# Patient Record
Sex: MALE | Race: ASIAN | NOT HISPANIC OR LATINO | Employment: UNEMPLOYED | URBAN - METROPOLITAN AREA
[De-identification: names, ages, dates, MRNs, and addresses within clinical notes are randomized per-mention and may not be internally consistent; named-entity substitution may affect disease eponyms.]

---

## 2022-06-04 ENCOUNTER — HOSPITAL ENCOUNTER (INPATIENT)
Facility: HOSPITAL | Age: 77
LOS: 1 days | Discharge: HOME | End: 2022-06-05
Attending: HOSPITALIST | Admitting: INTERNAL MEDICINE

## 2022-06-04 ENCOUNTER — APPOINTMENT (OUTPATIENT)
Dept: MRI IMAGING | Facility: HOSPITAL | Age: 77
End: 2022-06-04

## 2022-06-04 DIAGNOSIS — R26.2 DIFFICULTY IN WALKING: ICD-10-CM

## 2022-06-04 DIAGNOSIS — G43.B0 OPHTHALMOPLEGIC MIGRAINE, NOT INTRACTABLE: ICD-10-CM

## 2022-06-04 DIAGNOSIS — R13.10 DYSPHAGIA, UNSPECIFIED TYPE: Primary | ICD-10-CM

## 2022-06-04 PROBLEM — G47.33 OBSTRUCTIVE SLEEP APNEA: Status: ACTIVE | Noted: 2022-06-04

## 2022-06-04 PROBLEM — N13.8 BPH WITH OBSTRUCTION/LOWER URINARY TRACT SYMPTOMS: Status: ACTIVE | Noted: 2022-06-04

## 2022-06-04 PROBLEM — I10 PRIMARY HYPERTENSION: Status: ACTIVE | Noted: 2022-06-04

## 2022-06-04 PROBLEM — K58.2 IRRITABLE BOWEL SYNDROME WITH BOTH CONSTIPATION AND DIARRHEA: Status: ACTIVE | Noted: 2022-06-04

## 2022-06-04 PROBLEM — N40.1 BPH WITH OBSTRUCTION/LOWER URINARY TRACT SYMPTOMS: Status: ACTIVE | Noted: 2022-06-04

## 2022-06-04 PROBLEM — G45.9 TIA (TRANSIENT ISCHEMIC ATTACK): Status: ACTIVE | Noted: 2022-06-04

## 2022-06-04 LAB
ALBUMIN SERPL-MCNC: 4.1 G/DL (ref 3.5–5.2)
ALBUMIN/GLOB SERPL: 1.2 G/DL
ALP SERPL-CCNC: 78 U/L (ref 39–117)
ALT SERPL W P-5'-P-CCNC: 15 U/L (ref 1–41)
ANION GAP SERPL CALCULATED.3IONS-SCNC: 8.5 MMOL/L (ref 5–15)
AST SERPL-CCNC: 34 U/L (ref 1–40)
BASOPHILS # BLD AUTO: 0.03 10*3/MM3 (ref 0–0.2)
BASOPHILS NFR BLD AUTO: 0.8 % (ref 0–1.5)
BILIRUB SERPL-MCNC: 1.1 MG/DL (ref 0–1.2)
BUN SERPL-MCNC: 8 MG/DL (ref 8–23)
BUN/CREAT SERPL: 9.8 (ref 7–25)
CALCIUM SPEC-SCNC: 8.9 MG/DL (ref 8.6–10.5)
CHLORIDE SERPL-SCNC: 99 MMOL/L (ref 98–107)
CHOLEST SERPL-MCNC: 135 MG/DL (ref 0–200)
CO2 SERPL-SCNC: 27.5 MMOL/L (ref 22–29)
CREAT SERPL-MCNC: 0.82 MG/DL (ref 0.76–1.27)
DEPRECATED RDW RBC AUTO: 45.9 FL (ref 37–54)
EGFRCR SERPLBLD CKD-EPI 2021: 91.6 ML/MIN/1.73
EOSINOPHIL # BLD AUTO: 0.14 10*3/MM3 (ref 0–0.4)
EOSINOPHIL NFR BLD AUTO: 3.6 % (ref 0.3–6.2)
ERYTHROCYTE [DISTWIDTH] IN BLOOD BY AUTOMATED COUNT: 13.3 % (ref 12.3–15.4)
GLOBULIN UR ELPH-MCNC: 3.3 GM/DL
GLUCOSE BLDC GLUCOMTR-MCNC: 102 MG/DL (ref 70–99)
GLUCOSE BLDC GLUCOMTR-MCNC: 95 MG/DL (ref 70–99)
GLUCOSE SERPL-MCNC: 110 MG/DL (ref 65–99)
HBA1C MFR BLD: 5.2 % (ref 4.8–5.6)
HCT VFR BLD AUTO: 40.5 % (ref 37.5–51)
HDLC SERPL-MCNC: 38 MG/DL (ref 40–60)
HGB BLD-MCNC: 13.5 G/DL (ref 13–17.7)
IMM GRANULOCYTES # BLD AUTO: 0.02 10*3/MM3 (ref 0–0.05)
IMM GRANULOCYTES NFR BLD AUTO: 0.5 % (ref 0–0.5)
LARGE PLATELETS: NORMAL
LDLC SERPL CALC-MCNC: 72 MG/DL (ref 0–100)
LDLC/HDLC SERPL: 1.82 {RATIO}
LYMPHOCYTES # BLD AUTO: 0.9 10*3/MM3 (ref 0.7–3.1)
LYMPHOCYTES NFR BLD AUTO: 22.9 % (ref 19.6–45.3)
MCH RBC QN AUTO: 30.8 PG (ref 26.6–33)
MCHC RBC AUTO-ENTMCNC: 33.3 G/DL (ref 31.5–35.7)
MCV RBC AUTO: 92.5 FL (ref 79–97)
MONOCYTES # BLD AUTO: 0.47 10*3/MM3 (ref 0.1–0.9)
MONOCYTES NFR BLD AUTO: 12 % (ref 5–12)
NEUTROPHILS NFR BLD AUTO: 2.37 10*3/MM3 (ref 1.7–7)
NEUTROPHILS NFR BLD AUTO: 60.2 % (ref 42.7–76)
NRBC BLD AUTO-RTO: 0 /100 WBC (ref 0–0.2)
PLATELET # BLD AUTO: 60 10*3/MM3 (ref 140–450)
PMV BLD AUTO: 10.9 FL (ref 6–12)
POTASSIUM SERPL-SCNC: 4 MMOL/L (ref 3.5–5.2)
PROT SERPL-MCNC: 7.4 G/DL (ref 6–8.5)
RBC # BLD AUTO: 4.38 10*6/MM3 (ref 4.14–5.8)
RBC MORPH BLD: NORMAL
SMALL PLATELETS BLD QL SMEAR: NORMAL
SODIUM SERPL-SCNC: 135 MMOL/L (ref 136–145)
TRIGL SERPL-MCNC: 140 MG/DL (ref 0–150)
VLDLC SERPL-MCNC: 25 MG/DL (ref 5–40)
WBC MORPH BLD: NORMAL
WBC NRBC COR # BLD: 3.93 10*3/MM3 (ref 3.4–10.8)

## 2022-06-04 PROCEDURE — 83036 HEMOGLOBIN GLYCOSYLATED A1C: CPT | Performed by: HOSPITALIST

## 2022-06-04 PROCEDURE — 70551 MRI BRAIN STEM W/O DYE: CPT

## 2022-06-04 PROCEDURE — 85025 COMPLETE CBC W/AUTO DIFF WBC: CPT | Performed by: INTERNAL MEDICINE

## 2022-06-04 PROCEDURE — 94660 CPAP INITIATION&MGMT: CPT

## 2022-06-04 PROCEDURE — 92610 EVALUATE SWALLOWING FUNCTION: CPT

## 2022-06-04 PROCEDURE — 85007 BL SMEAR W/DIFF WBC COUNT: CPT | Performed by: INTERNAL MEDICINE

## 2022-06-04 PROCEDURE — 99223 1ST HOSP IP/OBS HIGH 75: CPT | Performed by: HOSPITALIST

## 2022-06-04 PROCEDURE — 97161 PT EVAL LOW COMPLEX 20 MIN: CPT

## 2022-06-04 PROCEDURE — 80061 LIPID PANEL: CPT | Performed by: HOSPITALIST

## 2022-06-04 PROCEDURE — 94799 UNLISTED PULMONARY SVC/PX: CPT

## 2022-06-04 PROCEDURE — 80053 COMPREHEN METABOLIC PANEL: CPT | Performed by: INTERNAL MEDICINE

## 2022-06-04 PROCEDURE — 82962 GLUCOSE BLOOD TEST: CPT

## 2022-06-04 RX ORDER — FLUTICASONE PROPIONATE 50 MCG
2 SPRAY, SUSPENSION (ML) NASAL DAILY
COMMUNITY

## 2022-06-04 RX ORDER — TAMSULOSIN HYDROCHLORIDE 0.4 MG/1
0.4 CAPSULE ORAL DAILY
Status: DISCONTINUED | OUTPATIENT
Start: 2022-06-04 | End: 2022-06-05 | Stop reason: HOSPADM

## 2022-06-04 RX ORDER — LOSARTAN POTASSIUM 25 MG/1
100 TABLET ORAL DAILY
Status: DISCONTINUED | OUTPATIENT
Start: 2022-06-04 | End: 2022-06-05 | Stop reason: HOSPADM

## 2022-06-04 RX ORDER — CITALOPRAM 40 MG/1
40 TABLET ORAL DAILY
COMMUNITY

## 2022-06-04 RX ORDER — CITALOPRAM 20 MG/1
40 TABLET ORAL DAILY
Status: DISCONTINUED | OUTPATIENT
Start: 2022-06-04 | End: 2022-06-05 | Stop reason: HOSPADM

## 2022-06-04 RX ORDER — ATENOLOL 25 MG/1
25 TABLET ORAL 2 TIMES DAILY
COMMUNITY

## 2022-06-04 RX ORDER — TAMSULOSIN HYDROCHLORIDE 0.4 MG/1
1 CAPSULE ORAL DAILY
COMMUNITY

## 2022-06-04 RX ORDER — ASPIRIN 300 MG/1
300 SUPPOSITORY RECTAL DAILY
Status: DISCONTINUED | OUTPATIENT
Start: 2022-06-04 | End: 2022-06-05 | Stop reason: HOSPADM

## 2022-06-04 RX ORDER — MONTELUKAST SODIUM 10 MG/1
10 TABLET ORAL DAILY
Status: DISCONTINUED | OUTPATIENT
Start: 2022-06-04 | End: 2022-06-05 | Stop reason: HOSPADM

## 2022-06-04 RX ORDER — SODIUM CHLORIDE 0.9 % (FLUSH) 0.9 %
10 SYRINGE (ML) INJECTION EVERY 12 HOURS SCHEDULED
Status: DISCONTINUED | OUTPATIENT
Start: 2022-06-04 | End: 2022-06-05 | Stop reason: HOSPADM

## 2022-06-04 RX ORDER — PANTOPRAZOLE SODIUM 40 MG/1
40 TABLET, DELAYED RELEASE ORAL DAILY
COMMUNITY

## 2022-06-04 RX ORDER — LOSARTAN POTASSIUM 100 MG/1
100 TABLET ORAL DAILY
COMMUNITY

## 2022-06-04 RX ORDER — LABETALOL HYDROCHLORIDE 5 MG/ML
10 INJECTION, SOLUTION INTRAVENOUS
Status: DISCONTINUED | OUTPATIENT
Start: 2022-06-04 | End: 2022-06-05 | Stop reason: HOSPADM

## 2022-06-04 RX ORDER — FLUTICASONE PROPIONATE 50 MCG
2 SPRAY, SUSPENSION (ML) NASAL DAILY
Status: DISCONTINUED | OUTPATIENT
Start: 2022-06-04 | End: 2022-06-05 | Stop reason: HOSPADM

## 2022-06-04 RX ORDER — BUTALBITAL, ACETAMINOPHEN AND CAFFEINE 300; 40; 50 MG/1; MG/1; MG/1
1 CAPSULE ORAL ONCE
Status: COMPLETED | OUTPATIENT
Start: 2022-06-04 | End: 2022-06-04

## 2022-06-04 RX ORDER — ATORVASTATIN CALCIUM 40 MG/1
80 TABLET, FILM COATED ORAL NIGHTLY
Status: DISCONTINUED | OUTPATIENT
Start: 2022-06-04 | End: 2022-06-05 | Stop reason: HOSPADM

## 2022-06-04 RX ORDER — PANTOPRAZOLE SODIUM 40 MG/1
40 TABLET, DELAYED RELEASE ORAL
Status: DISCONTINUED | OUTPATIENT
Start: 2022-06-04 | End: 2022-06-05 | Stop reason: HOSPADM

## 2022-06-04 RX ORDER — MONTELUKAST SODIUM 10 MG/1
10 TABLET ORAL DAILY
COMMUNITY

## 2022-06-04 RX ORDER — BUTALBITAL, ACETAMINOPHEN AND CAFFEINE 300; 40; 50 MG/1; MG/1; MG/1
1 CAPSULE ORAL EVERY 4 HOURS PRN
Status: DISCONTINUED | OUTPATIENT
Start: 2022-06-04 | End: 2022-06-05 | Stop reason: HOSPADM

## 2022-06-04 RX ORDER — ASPIRIN 81 MG/1
81 TABLET, CHEWABLE ORAL DAILY
Status: DISCONTINUED | OUTPATIENT
Start: 2022-06-04 | End: 2022-06-05 | Stop reason: HOSPADM

## 2022-06-04 RX ORDER — SODIUM CHLORIDE 0.9 % (FLUSH) 0.9 %
10 SYRINGE (ML) INJECTION AS NEEDED
Status: DISCONTINUED | OUTPATIENT
Start: 2022-06-04 | End: 2022-06-05 | Stop reason: HOSPADM

## 2022-06-04 RX ADMIN — LOSARTAN POTASSIUM 100 MG: 25 TABLET, FILM COATED ORAL at 11:33

## 2022-06-04 RX ADMIN — TAMSULOSIN HYDROCHLORIDE 0.4 MG: 0.4 CAPSULE ORAL at 08:36

## 2022-06-04 RX ADMIN — FLUTICASONE PROPIONATE 2 SPRAY: 50 SPRAY, METERED NASAL at 08:36

## 2022-06-04 RX ADMIN — Medication 10 ML: at 08:36

## 2022-06-04 RX ADMIN — MONTELUKAST 10 MG: 10 TABLET, FILM COATED ORAL at 08:35

## 2022-06-04 RX ADMIN — CARBIDOPA AND LEVODOPA 1 TABLET: 25; 100 TABLET ORAL at 08:35

## 2022-06-04 RX ADMIN — Medication 10 ML: at 21:01

## 2022-06-04 RX ADMIN — CITALOPRAM HYDROBROMIDE 40 MG: 20 TABLET ORAL at 08:35

## 2022-06-04 RX ADMIN — ASPIRIN 81 MG CHEWABLE TABLET 81 MG: 81 TABLET CHEWABLE at 08:35

## 2022-06-04 RX ADMIN — BUTALBITAL, ACETAMINOPHEN AND CAFFEINE 1 CAPSULE: 300; 40; 50 CAPSULE ORAL at 11:33

## 2022-06-04 RX ADMIN — PANTOPRAZOLE SODIUM 40 MG: 40 TABLET, DELAYED RELEASE ORAL at 06:17

## 2022-06-04 RX ADMIN — ATORVASTATIN CALCIUM 80 MG: 40 TABLET, FILM COATED ORAL at 21:01

## 2022-06-04 RX ADMIN — CARBIDOPA AND LEVODOPA 2 TABLET: 25; 100 TABLET ORAL at 17:11

## 2022-06-04 NOTE — THERAPY EVALUATION
Acute Care - Physical Therapy Initial Evaluation   Brad     Patient Name: López Lee  : 1945  MRN: [id number]  Today's Date: 2022   Onset of Illness/Injury or Date of Surgery: 22  Visit Dx:     ICD-10-CM ICD-9-CM   1. Dysphagia, unspecified type  R13.10 787.20   2. Difficulty in walking  R26.2 719.7     Patient Active Problem List   Diagnosis   • TIA (transient ischemic attack)   • BPH with obstruction/lower urinary tract symptoms   • Primary hypertension   • Irritable bowel syndrome with both constipation and diarrhea   • Obstructive sleep apnea     Past Medical History:   Diagnosis Date   • Enlarged prostate    • Hypertension    • Irritable bowel syndrome (IBS)    • Sleep apnea      Past Surgical History:   Procedure Laterality Date   • EYE SURGERY     • TONSILLECTOMY       PT Assessment (last 12 hours)     PT Evaluation and Treatment     Row Name 22 1230          Physical Therapy Time and Intention    Subjective Information no complaints  -DW     Document Type evaluation  -DW     Mode of Treatment individual therapy  -DW     Patient Effort good  -DW     Row Name 22 1230          General Information    Patient Profile Reviewed yes  -DW     Onset of Illness/Injury or Date of Surgery 22  -DW     Referring Physician Mirza Mcmanus  -DW     Patient Observations alert  -DW     Prior Level of Function independent:  -DW     Equipment Currently Used at Home none  -DW     Benefits Reviewed patient:  -DW     Row Name 22 1230          Previous Level of Function/Home Environm    BADLs, Premorbid Functional Level independent  -DW     IADLs, Premorbid Functional Level independent  -DW     Transfers, Premorbid Functional Level independent  -DW     Community Ambulation, Premorbid Functional Level independent  -DW     Row Name 22 1230          Living Environment    Current Living Arrangements home  -DW     People in Home spouse  -DW     Row Name 22 1230           Pain    Pretreatment Pain Rating 0/10 - no pain  -     Row Name 06/04/22 1230          Cognition    Affect/Mental Status (Cognition) WNL  -     Orientation Status (Cognition) oriented x 3  -     Row Name 06/04/22 1230          Range of Motion (ROM)    Range of Motion ROM is WNL  -     Row Name 06/04/22 1230          Strength (Manual Muscle Testing)    Strength (Manual Muscle Testing) strength is WNL  -     Row Name 06/04/22 1230          Bed Mobility    Bed Mobility bed mobility (all) activities  -     All Activities, [place] (Bed Mobility) independent  -     Row Name 06/04/22 1230          Transfers    Transfers bed-chair transfer  -DW     Bed-Chair [place] (Transfers) independent  -     Row Name 06/04/22 1230          Gait/Stairs (Locomotion)    Gait/Stairs Locomotion gait/ambulation independence;distance ambulated  -     [place] Level (Gait) independent  -     Distance in Feet (Gait) 300  -     Row Name 06/04/22 1230          Balance    Balance Assessment standing dynamic balance  -     Dynamic Standing Balance independent  -     Row Name 06/04/22 1230          Plan of Care Review    Plan of Care Reviewed With patient  -     Progress improving  -     Outcome Evaluation DC PT due to pt being at prior level of function.  No skilled PT intervention is needed at this time. q  -     Row Name 06/04/22 1230          Therapy Assessment/Plan (PT)    PT Diagnosis (PT) Difficulty in walking  -     Therapy Frequency (PT) evaluation only  -     Row Name 06/04/22 1230          PT Evaluation Complexity    History, PT Evaluation Complexity no personal factors and/or comorbidities  -     Examination of Body Systems (PT Eval Complexity) 1-2 elements  -     Clinical Presentation (PT Evaluation Complexity) stable  -     Clinical Decision Making (PT Evaluation Complexity) low complexity  -     Overall Complexity (PT Evaluation Complexity) low complexity  -     Row Name  06/04/22 1230          Therapy Plan Review/Discharge Plan (PT)    Therapy Plan Review (PT) evaluation/treatment results reviewed  -           User Key  (r) = Recorded By, (t) = Taken By, (c) = Cosigned By    Initials Name Provider Type    Tony Joseph, RIKY Physical Therapist                Physical Therapy Education                 Title: PT OT SLP Therapies (Done)     Topic: Physical Therapy (Done)     Point: Mobility training (Done)     Learning Progress Summary           Patient Acceptance, E,TB, VU by LORI at 6/4/2022 1234                   Point: Home exercise program (Done)     Learning Progress Summary           Patient Acceptance, E,TB, VU by LORI at 6/4/2022 1234                   Point: Body mechanics (Done)     Learning Progress Summary           Patient Acceptance, E,TB, VU by LORI at 6/4/2022 1234                   Point: Precautions (Done)     Learning Progress Summary           Patient Acceptance, E,TB, VU by  at 6/4/2022 1234                               User Key     Initials Effective Dates Name Provider Type Discipline     04/25/21 -  Tony Figueroa, PT Physical Therapist PT              PT Recommendation and Plan  Anticipated Discharge Disposition (PT): home  Therapy Frequency (PT): evaluation only  Plan of Care Reviewed With: patient  Progress: improving  Outcome Evaluation: DC PT due to pt being at prior level of function.  No skilled PT intervention is needed at this time. q   Outcome Measures     Row Name 06/04/22 1234             How much help from another person do you currently need...    Turning from your back to your side while in flat bed without using bedrails? 4  -DW      Moving from lying on back to sitting on the side of a flat bed without bedrails? 4  -DW      Moving to and from a bed to a chair (including a wheelchair)? 4  -DW      Standing up from a chair using your arms (e.g., wheelchair, bedside chair)? 4  -DW      Climbing 3-5 steps with a railing? 4  -DW      To walk in  hospital room? 4  -DW      AM-PAC 6 Clicks Score (PT) 24  -DW              Functional Assessment    Outcome Measure Options AM-PAC 6 Clicks Basic Mobility (PT)  -DW            User Key  (r) = Recorded By, (t) = Taken By, (c) = Cosigned By    Initials Name Provider Type    Tony Joseph PT Physical Therapist                 Time Calculation:    PT Charges     Row Name 06/04/22 1235             Time Calculation    PT Received On 06/04/22  -DW              Untimed Charges    PT Eval/Re-eval Minutes 15  -DW              Total Minutes    Untimed Charges Total Minutes 15  -DW       Total Minutes 15  -DW            User Key  (r) = Recorded By, (t) = Taken By, (c) = Cosigned By    Initials Name Provider Type    Tony Joseph PT Physical Therapist              Therapy Charges for Today     Code Description Service Date Service Provider Modifiers Qty    29412616115 HC PT EVAL LOW COMPLEXITY 2 6/4/2022 Tony Figueroa, PT GP 1          PT G-Codes  Outcome Measure Options: AM-PAC 6 Clicks Basic Mobility (PT)  AM-PAC 6 Clicks Score (PT): 24    Tony Figueroa PT  6/4/2022

## 2022-06-04 NOTE — CONSULTS
[facility]   Consult Note      Patient Name: López Lee  : 1945  MRN: [id number]  Primary Care Physician:  Provider, No Known  Date of admission: 2022    Subjective   Subjective     This 76 years old gentleman was seen upon request of Dr. Mcmanus for evaluation.    Chief Complaint:   Stroke    HPI:  The wife is at the bedside.    He dated the onset of his illness sometime on Mayra 3, 2022 when he started having visual difficulties.  He claims that his vision was dark on the outside and blurry in the center.  This was accompanied by nondescript generalized though headache with a grade of 2/10.  Shortly afterwards, he started having difficulty in finding words to say.  There is no associate difficulty in hearing as well as in swallowing.  No difficulty in breathing.  No chest pains or abdominal pain.  There is no numbness anywhere.  The whole spell lasted for 20 minutes.  Afterwards, he was all right.  He went to the [facility] and was subsequently transferred to the [facility] and was subsequently admitted.      Review of Systems  Is no difficulty in hearing as well as in swallowing.  No difficulty breathing.  There is no chest pains or abdominal pains.  He was not incontinent of his urine.      Past Medical History:   Diagnosis Date   • Enlarged prostate    • Hypertension    • Irritable bowel syndrome (IBS)    • Sleep apnea        Past Surgical History:   Procedure Laterality Date   • EYE SURGERY     • TONSILLECTOMY         Family History: family history is not on file. Otherwise pertinent FHx was reviewed and not pertinent to current issue.    Social History:  reports that he has never smoked. He does not have any smokeless tobacco history on file.    Psychosocial History: Not known at this time    Home Medications:  Choline Bitartrate, carbidopa-levodopa, citalopram, fluticasone, losartan, montelukast, pantoprazole, and tamsulosin      Allergies:  No Known  Allergies    Vitals:   Temp:  [98.1 °F (36.7 °C)-98.2 °F (36.8 °C)] 98.2 °F (36.8 °C)  Heart Rate:  [55-62] 55  Resp:  [16] 16  BP: (146-184)/(71-93) 158/75  Physical Exam   He was awake and alert was standing from distress.  Was fairly developed and fairly nourished.    His heart was regular heart rate was 56/min.  There were no murmurs.  The lungs were clear.    The carotid pulses were 1+ and equal.  There were no bruits on either side.    Neurological Examination:  's responses were coherent and relevant.  He was aware of what was going on around him.  He had an insight to his condition.  He was able to understand and follow verbal commands.    I did not look into the fundus on either side because of the COVID-19 pandemic social distancing.    The pupils were 3 to 4 mm. They were round and equal reactive to light directly and consensually.  There was no extraocular muscle weakness.    No facial asymmetry.  No facial weakness.  Was able to hear normal conversational speech.    The strength of the sternomastoid and trapezius muscles was normal and symmetrical.  The uvula and the tongue were in the midline.    The strength in both upper and lower extremities was normal and equal.    Felt pinprick equal in both sides of the forehead, face, lower jaw, both upper and lower extremities, and both sides of his trunk.    The deep tendon reflexes were absent on both sides.    Did finger-to-nose test fairly well equal on both sides.      Result Review    Result Review:  I have personally reviewed the results from the time of this admission to 6/4/2022 12:16 EDT and agree with these findings:  []  Laboratory  []  Microbiology  [x]  Radiology  []  EKG/Telemetry   []  Cardiology/Vascular   []  Pathology  []  Old records  []  Other:  Most notable findings include:   Reviewed the computer images of the CAT scan of his brain was done on Mayra 3, 2022.  The study showed no acute changes.  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  There is moderate subcortical and periventricular white matter changes consistent with old microvascular ischemia.    I reviewed the computer images of the CT angiogram of the neck and cerebral vessels done on Mayra 3, 2022.  Study were unremarkable.  There were no hemodynamically significant stenosis or narrowing of the carotid and vertebral arterial system in the neck and their branches in the brain.      Impression:    Basilar Artery Migraine  Hypertension  Bradycardia, etiology undetermined        Plan:   We will see what the MRI of the brain show.  He was advised to refrain from being exposed to triggers of migraine.              Please note that portions of this note were completed with a voice recognition program. Some letter(s), word(s), phrase(s), and or sentence(s) may be inadvertently omitted, transcribed, or added that may change the concept or idea that is being portrayed by the author of this note or report.  This note or report have been reviewed and edited by the author.  However, the errors may recur and new errors may occur in the process when this note or report is being electronically saved.          Electronically signed by Roddy Lea Jr., MD, 06/04/22, 12:16 PM EDT.

## 2022-06-04 NOTE — PLAN OF CARE
Goal Outcome Evaluation: NIH: 0; no deficits noted. MRI completed around lunchtime- no acute infarcts seen. Platelets and HDL low, but no other significant labs. VSS. RWRN

## 2022-06-04 NOTE — THERAPY EVALUATION
Acute Care - Speech Language Pathology   Swallow Initial Evaluation HENRI Salinas     Patient Name: López Lee  : 1945  MRN: [id number]  Today's Date: 2022               Admit Date: 2022    Visit Dx:     ICD-10-CM ICD-9-CM   1. Dysphagia, unspecified type  R13.10 787.20     Patient Active Problem List   Diagnosis   • TIA (transient ischemic attack)   • BPH with obstruction/lower urinary tract symptoms   • Primary hypertension   • Irritable bowel syndrome with both constipation and diarrhea   • Obstructive sleep apnea     Past Medical History:   Diagnosis Date   • Enlarged prostate    • Hypertension    • Irritable bowel syndrome (IBS)    • Sleep apnea      Past Surgical History:   Procedure Laterality Date   • EYE SURGERY     • TONSILLECTOMY             Inpatient Speech Pathology Dysphagia Evaluation        PAIN SCALE: None indicated.    PRECAUTIONS/CONTRAINDICATIONS: Standard    SUSPECTED ABUSE/NEGLECT/EXPLOITATION: None indicated.    SOCIAL/PSYCHOLOGICAL NEEDS/BARRIERS: None indicated.    PAST SOCIAL HISTORY: 76-year-old male lives at home    PRIOR FUNCTION: Independent    PATIENT GOALS/EXPECTATIONS: Return home    HISTORY: 76-year-old male with the above diagnosis referred for speech therapy evaluation due to possible stroke.  No previous speech therapy is reported.  Patient states initially feeling difficulty finding words but that this has resolved.    CURRENT DIET LEVEL: Regular    OBJECTIVE:    TEST ADMINISTERED: Clinical dysphagia evaluation    COGNITION/SAFETY AWARENESS: Patient followed directions and answered questions without difficulty    BEHAVIORAL OBSERVATIONS: Alert and cooperative    ORAL MOTOR EXAM: Grossly within functional limits    VOICE QUALITY: Adequate    REFLEX EXAM: Deferred    POSTURE: Sitting at side of bed    FEEDING/SWALLOWING FUNCTION: Assessed with  thin liquids, puréed solids, crunchy solid.    CLINICAL OBSERVATIONS:  Thin liquid by cup and by straw appeared timely  with vocal quality remaining clear to cervical auscultation.  Purée solid with swallow completed with laryngeal elevation noted to palpation.  Crunchy solid with adequate chewing followed by swallow completed clearing the oral cavity.    DYSPHAGIA CRITERIA: Swallow appears functional for nutritional needs.  No overt clinical signs or symptoms of aspiration were noted at the bedside.    FUNCTIONAL ASSESSMENT INSTRUMENT: Patient currently scored a level 7 of 7 on Functional Communication Measures for swallowing indicating a 0 % limitation in function.    ASSESSMENT/ PLAN OF CARE:  No direct speech therapy is recommended at this time. Recommend rereferral should patient demonstrate change in status.    RECOMMENDATIONS:   1.   DIET: Diet of regular solids and thin liquids alternating small bites and small sips while sitting fully upright.        Pt/responsible party agrees with plan of care and has been informed of all alternatives, risks and benefits.                            Anticipated Discharge Disposition (SLP): home (06/04/22 0953)                                                      EDUCATION  The patient has been educated in the following areas:   Dysphagia (Swallowing Impairment) Modified Diet Instruction.              Time Calculation:    Time Calculation- SLP     Row Name 06/04/22 0953             Time Calculation- SLP    SLP Start Time 0815  -TB      SLP Stop Time 0915  -TB      SLP Time Calculation (min) 60 min  -TB      SLP Received On 06/04/22  -TB              Untimed Charges    SLP Eval/Re-eval  ST Eval Oral Pharyng Swallow - 77967  -TB      [id number] Eval Oral Pharyng Swallow Minutes 60  -TB              Total Minutes    Untimed Charges Total Minutes 60  -TB       Total Minutes 60  -TB            User Key  (r) = Recorded By, (t) = Taken By, (c) = Cosigned By    Initials Name Provider Type    TB Radha Fung SLP Speech and Language Pathologist                Therapy Charges for Today     Code  Description Service Date Service Provider Modifiers Qty    [id number]  ST EVAL ORAL PHARYNG SWALLOW 4 6/4/2022 Radha Fung, SLP GN 1               ANGIE Mascorro  6/4/2022

## 2022-06-04 NOTE — H&P
[facility] HISTORY AND PHYSICAL  Date: 2022   Patient Name: López Lee  : 1945  MRN: [id number]  Primary Care Physician:  Supriya, No Known  Date of admission: 2022    Subjective   Subjective     Chief Complaint: Double vision and difficulty speaking x1 day    HPI:    López Lee is a 76 y.o. male with medical history significant for BPH, primary hypertension, obstructive sleep apnea, IBS; presents with double vision and difficulty speaking x1 day.  Patient reports that he works as a  at a hospital.  Patient states that he was working at his computer when he started to experience tunnel vision.  Patient reports that he felt as though the lettering on the keyboard were coming out at him and things look as though they were double vision.  Patient states he also had difficulty being able to get out his words.  Patient states that he went and got his blood pressure checked and found that his systolic blood pressure was in the 200s.  At that time, patient wanted to get checked out in order to make sure nothing was going on.  Patient went to the ER for further work-up.  Patient states he has experienced these similar symptoms in the past, but has never lasted this long.  Patient states that the episode lasted for approximately 20 minutes.  Patient notes that during the episode, he did experience headache and states he was given morphine in the ER which helped to bring down his pain to 2 out of 10.  Patient states that he was sent to our facility for a complete work-up for possible stroke.  Patient had CT of the head along with CTA of the neck which showed no abnormalities.  Patient states he is currently back to his baseline.  No fever, no chills, no change in activity, no change in appetite, no URI or UTI type symptoms, no shortness of breath, no cough, no wheezing, no nausea, no vomiting, no dizziness.  Patient states that he has IBS and typically goes from constipation  to diarrhea intermittently.  Patient reports he had a work-up for cluster headaches approximately 30 years ago.  Patient states that was the last time he had an MRI of the brain, which was normal.      Personal History     Past Medical History:  Past Medical History:   Diagnosis Date   • Enlarged prostate    • Hypertension    • Irritable bowel syndrome (IBS)    • Sleep apnea         Past Surgical History:  Past Surgical History:   Procedure Laterality Date   • EYE SURGERY     • TONSILLECTOMY     Occasional alcohol use    Family History:   Intrauterine pregnancy    Social History:   Social History     Socioeconomic History   • Marital status: Single   Tobacco Use   • Smoking status: Never Smoker        Home Medications:  Prior to Admission medications    Medication Sig Start Date End Date Taking? Authorizing Provider   carbidopa-levodopa (SINEMET)  MG per tablet Take 1 tablet by mouth Every Morning.   Yes Cristian Epps MD   carbidopa-levodopa (SINEMET)  MG per tablet Take 2 tablets by mouth Every Evening.   Yes Cristian Epps MD   Choline Bitartrate (CHOLINE PO) Take  by mouth.   Yes Cristian Epps MD   citalopram (CeleXA) 40 MG tablet Take 40 mg by mouth Daily.   Yes Cristian Epps MD   fluticasone (FLONASE) 50 MCG/ACT nasal spray 2 sprays into the nostril(s) as directed by provider Daily.   Yes Cristian Epps MD   losartan (COZAAR) 100 MG tablet Take 100 mg by mouth Daily.   Yes Cristian Epps MD   montelukast (SINGULAIR) 10 MG tablet Take 10 mg by mouth Daily.   Yes Cristian Epps MD   pantoprazole (PROTONIX) 40 MG EC tablet Take 40 mg by mouth Daily.   Yes Cristian Epps MD   tamsulosin (FLOMAX) 0.4 MG capsule 24 hr capsule Take 1 capsule by mouth Daily.    Cristian Epps MD        Allergies:  No Known Allergies    Review of Systems  Review of Systems   Constitutional: Negative for activity change, appetite change, chills, fatigue and  fever.   HENT: Negative for congestion, ear pain, postnasal drip, rhinorrhea, sinus pressure, sinus pain, sneezing and sore throat.    Eyes: Negative for itching.   Respiratory: Negative for cough, shortness of breath and wheezing.    Cardiovascular: Positive for chest pain. Negative for leg swelling.   Gastrointestinal: Positive for constipation and diarrhea. Negative for abdominal pain, nausea and vomiting.   Endocrine: Negative for polydipsia and polyphagia.   Genitourinary: Negative for decreased urine volume, difficulty urinating, dysuria, flank pain, frequency, hematuria and urgency.   Musculoskeletal: Negative for arthralgias, back pain, gait problem, myalgias and neck pain.   Skin: Negative for color change.   Neurological: Positive for speech difficulty and headaches. Negative for dizziness, syncope, facial asymmetry, weakness and numbness.   Psychiatric/Behavioral: Negative for agitation and confusion.           Objective   Objective     Vitals:   Temp:  [98.1 °F (36.7 °C)] 98.1 °F (36.7 °C)  Heart Rate:  [60] 60  Resp:  [16] 16  BP: (162)/(85) 162/85    Physical Exam   Physical Exam  Constitutional:       General: He is not in acute distress.     Appearance: Normal appearance. He is not ill-appearing.   HENT:      Head: Normocephalic and atraumatic.      Right Ear: External ear normal.      Left Ear: External ear normal.      Nose: Nose normal.      Mouth/Throat:      Mouth: Mucous membranes are moist.   Eyes:      Extraocular Movements: Extraocular movements intact.      Pupils: Pupils are equal, round, and reactive to light.   Cardiovascular:      Rate and Rhythm: Normal rate and regular rhythm.      Pulses: Normal pulses.      Heart sounds: Normal heart sounds. No murmur heard.    No friction rub. No gallop.   Pulmonary:      Effort: Pulmonary effort is normal. No respiratory distress.      Breath sounds: Normal breath sounds. No wheezing, rhonchi or rales.   Abdominal:      General: Bowel sounds are  normal. There is no distension.      Tenderness: There is no abdominal tenderness.   Musculoskeletal:         General: No swelling. Normal range of motion.      Cervical back: Normal range of motion and neck supple.   Skin:     General: Skin is warm.   Neurological:      General: No focal deficit present.      Mental Status: He is alert and oriented to person, place, and time.      Cranial Nerves: No cranial nerve deficit.      Sensory: No sensory deficit.      Motor: No weakness.      Coordination: Coordination normal.          Result Review    Result Review:  Labs from [facility]  PT 15.5, INR 1.3, PTT 32.7, glucose 89, BUN 12, creatinine 0.75, sodium 135, potassium 4.2, chloride 100, bicarb 28, calcium 8.7, total protein 7.8, albumin 4, total bilirubin 0.9, alkaline phosphatase 92, AST 41, ALT 34, anion gap 6.8, cholesterol 144, triglycerides 112, HDL 40, LDL 85, troponin 17.9, WBC 4.6, RBC 4.56, hemoglobin 14.4, hematocrit 42.5, MCV 93.2 MCH 31.6, RDW 13.5, platelets 77, neutrophils 55.2%, lymphocytes 27.0%, monocytes 12.5%, eosinophils 4.0%, basophils 1.1%    COVID-19-negative    UA: Color yellow, clarity clear, specific gravity 1.02, pH 6, protein negative, ketones negative, nitrites negative, leukocyte esterase negative    Imaging:  CTA head and neck with IV contrast  Impression: No evidence of vascular injury, aneurysm, hemodynamically significant stenosis or major vessel occlusion of the intracranial arterial system.  0% stenosis of the carotid bulbs by NASCET criteria.  Patent vertebral arteries.    Chest x-ray  Impression:  No acute cardiopulmonary process.    CT head  Impression:  No acute intracranial abnormality.      Assessment & Plan   Assessment / Plan     Active Hospital Problems    Diagnosis  POA   • TIA (transient ischemic attack) [G45.9]  Yes     Priority: High   • BPH with obstruction/lower urinary tract symptoms [N40.1, N13.8]  Yes     Priority: Low   • Primary hypertension [I10]   Yes     Priority: Low   • Irritable bowel syndrome with both constipation and diarrhea [K58.2]  Yes     Priority: Low   • Obstructive sleep apnea [G47.33]  Yes     Priority: Low        Assessment/Plan:   TIA-patient presents with tunnel vision and difficulty being able to get his words out.  Patient states that episode lasted for approximately 20 minutes.  Patient noted to have systolic blood pressures in the 200s at the time.  Patient has had a CT of the head along with CTA of the neck done at outside facility.  This is normal.  Patient will complete a stroke work-up as outlined below.  Neurochecks every 4 hours  Neurology consult  Will get MRI of the brain  We will get 2D echo  PT/OT/speech consult  Primary hypertension-patient with history of hypertension.  Patient blood pressure medications will be held for now in order to allow permissible hypertension.  BPH-patient with history of BPH.  Patient will be continued on Flomax.  IBS-patient with history of IBS.  Patient states he tends to get intermittent constipation versus diarrhea.  Obstructive sleep apnea-patient reports that he sometimes has shortness of breath secondary to his underlying obstructive sleep apnea.      DVT prophylaxis:  Mechanical DVT prophylaxis orders are present.    CODE STATUS:    Level Of Support Discussed With: Patient  Code Status (Patient has no pulse and is not breathing): CPR (Attempt to Resuscitate)  Medical Interventions (Patient has pulse or is breathing): Full Support      Admission Status:  I believe this patient meets inpatient status.  Total time spent 70 minutes.    Electronically signed by Yanni Walker MD, 06/04/22, 5:56 AM EDT.

## 2022-06-04 NOTE — PLAN OF CARE
Goal Outcome Evaluation:  Plan of Care Reviewed With: patient         DYSPHAGIA CRITERIA: Swallow appears functional for nutritional needs.  No overt clinical signs or symptoms of aspiration were noted at the bedside.    FUNCTIONAL ASSESSMENT INSTRUMENT: Patient currently scored a level 7 of 7 on Functional Communication Measures for swallowing indicating a 0 % limitation in function.    ASSESSMENT/ PLAN OF CARE:  No direct speech therapy is recommended at this time. Recommend rereferral should patient demonstrate change in status.    RECOMMENDATIONS:   1.   DIET: Diet of regular solids and thin liquids alternating small bites and small sips while sitting fully upright.

## 2022-06-04 NOTE — PLAN OF CARE
Goal Outcome Evaluation:  Plan of Care Reviewed With: patient        Progress: improving  Outcome Evaluation: DC PT due to pt being at prior level of function.  No skilled PT intervention is needed at this time. q

## 2022-06-05 ENCOUNTER — READMISSION MANAGEMENT (OUTPATIENT)
Dept: CALL CENTER | Facility: HOSPITAL | Age: 77
End: 2022-06-05

## 2022-06-05 VITALS
DIASTOLIC BLOOD PRESSURE: 79 MMHG | TEMPERATURE: 97.9 F | RESPIRATION RATE: 16 BRPM | HEIGHT: 65 IN | BODY MASS INDEX: 32.91 KG/M2 | WEIGHT: 197.53 LBS | HEART RATE: 60 BPM | SYSTOLIC BLOOD PRESSURE: 159 MMHG | OXYGEN SATURATION: 97 %

## 2022-06-05 PROCEDURE — 94799 UNLISTED PULMONARY SVC/PX: CPT

## 2022-06-05 PROCEDURE — 99239 HOSP IP/OBS DSCHRG MGMT >30: CPT | Performed by: INTERNAL MEDICINE

## 2022-06-05 RX ORDER — ASPIRIN 81 MG/1
81 TABLET, CHEWABLE ORAL DAILY
Qty: 30 TABLET | Refills: 0 | Status: SHIPPED | OUTPATIENT
Start: 2022-06-06

## 2022-06-05 RX ORDER — ATORVASTATIN CALCIUM 80 MG/1
80 TABLET, FILM COATED ORAL NIGHTLY
Qty: 30 TABLET | Refills: 0 | Status: SHIPPED | OUTPATIENT
Start: 2022-06-05

## 2022-06-05 RX ORDER — BUTALBITAL, ACETAMINOPHEN AND CAFFEINE 300; 40; 50 MG/1; MG/1; MG/1
1 CAPSULE ORAL EVERY 6 HOURS PRN
Qty: 16 CAPSULE | Refills: 0 | Status: SHIPPED | OUTPATIENT
Start: 2022-06-05

## 2022-06-05 RX ADMIN — LOSARTAN POTASSIUM 100 MG: 25 TABLET, FILM COATED ORAL at 09:04

## 2022-06-05 RX ADMIN — MONTELUKAST 10 MG: 10 TABLET, FILM COATED ORAL at 09:04

## 2022-06-05 RX ADMIN — FLUTICASONE PROPIONATE 2 SPRAY: 50 SPRAY, METERED NASAL at 09:04

## 2022-06-05 RX ADMIN — Medication 10 ML: at 09:04

## 2022-06-05 RX ADMIN — PANTOPRAZOLE SODIUM 40 MG: 40 TABLET, DELAYED RELEASE ORAL at 06:25

## 2022-06-05 RX ADMIN — CITALOPRAM HYDROBROMIDE 40 MG: 20 TABLET ORAL at 09:04

## 2022-06-05 RX ADMIN — TAMSULOSIN HYDROCHLORIDE 0.4 MG: 0.4 CAPSULE ORAL at 09:04

## 2022-06-05 RX ADMIN — CARBIDOPA AND LEVODOPA 1 TABLET: 25; 100 TABLET ORAL at 06:25

## 2022-06-05 RX ADMIN — ASPIRIN 81 MG CHEWABLE TABLET 81 MG: 81 TABLET CHEWABLE at 09:04

## 2022-06-05 NOTE — DISCHARGE SUMMARY
[facility]         HOSPITALIST  DISCHARGE SUMMARY    Patient Name: López Lee  : 1945  MRN: [id number]    Date of Admission: 2022  Date of Discharge: 2022  Primary Care Physician: Provider, No Known    Consults     Date and Time Order Name Status Description    2022  7:49 AM Inpatient Neurology Consult Stroke            Active and Resolved Hospital Problems:  Active Hospital Problems    Diagnosis POA   • TIA (transient ischemic attack) [G45.9] Yes   • BPH with obstruction/lower urinary tract symptoms [N40.1, N13.8] Yes   • Primary hypertension [I10] Yes   • Irritable bowel syndrome with both constipation and diarrhea [K58.2] Yes   • Obstructive sleep apnea [G47.33] Yes      Resolved Hospital Problems   No resolved problems to display.   Questionable TIA  Basilar artery migraine  Hypertension  Asymptomatic bradycardia  BPH  IBS  Obstructive sleep apnea    Hospital Course     Hospital Course:  López Lee is a 76 y.o. male medical history significant for BPH, primary hypertension, obstructive sleep apnea, IBS; presents with double vision and difficulty speaking x1 day.  Patient states that he was working at his computer when he started to experience tunnel vision and difficulty being able to get out his words. Patient states he has experienced these similar symptoms in the past, but has never lasted this long.  Patient states that the episode lasted for approximately 20 minutes.  Patient notes that during the episode, he did experience headache and states he was given morphine in the ER which helped to bring down his pain to 2 out of 10.  Patient states that he was sent to our facility for a complete work-up for possible stroke.  Patient had CT of the head along with CTA of the neck which showed no abnormalities.    He was admitted for further care, MRI revealed no evidence of stroke.  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  All  symptoms and headache resolved prior to his admission and after Fioricet.  He was started on aspirin and statin as we cannot completely rule out TIA.  PT/OT/speech were consulted and no further therapy was recommended.  He was discharged home in stable condition on 6/5/2022.  Recommend follow-up PCP in 1 week, neurology in 1 month.    Day of Discharge     Vital Signs:  Temp:  [97.9 °F (36.6 °C)-98.8 °F (37.1 °C)] 97.9 °F (36.6 °C)  Heart Rate:  [55-62] 60  Resp:  [16-18] 18  BP: (137-177)/(63-90) 159/79  Physical Exam:   Gen: NAD, WDWN  ENT: PERRL, EOMI   CV: RRR no MRG  Pulm: CTAB, no w/r/r  GI: Abd soft, NTND, +bs  Neuro: Moving all extremities spontaneously, CN II-XII grossly intact   Psych: A&O*3, normal mood and affect  Skin: No lesions or rashes noted      Discharge Details        Discharge Medications      New Medications      Instructions Start Date   aspirin 81 MG chewable tablet   81 mg, Oral, Daily   Start Date: June 6, 2022     atorvastatin 80 MG tablet  Commonly known as: LIPITOR   80 mg, Oral, Nightly      butalbital-acetaminophen-caffeine -40 MG capsule capsule  Commonly known as: ORBIVAN   1 capsule, Oral, Every 6 Hours PRN         Continue These Medications      Instructions Start Date   atenolol 25 MG tablet  Commonly known as: TENORMIN   25 mg, Oral, 2 Times Daily      carbidopa-levodopa  MG per tablet  Commonly known as: SINEMET   1 tablet, Oral, Every Morning      carbidopa-levodopa  MG per tablet  Commonly known as: SINEMET   2 tablets, Oral, Every Evening      CHOLINE PO   3 tablets, Oral, 2 Times Daily      citalopram 40 MG tablet  Commonly known as: CeleXA   40 mg, Oral, Daily      fluticasone 50 MCG/ACT nasal spray  Commonly known as: FLONASE   2 sprays, Nasal, Daily      losartan 100 MG tablet  Commonly known as: COZAAR   100 mg, Oral, Daily      montelukast 10 MG tablet  Commonly known as: SINGULAIR   10 mg, Oral, Daily      pantoprazole 40 MG EC tablet  Commonly known  as: PROTONIX   40 mg, Oral, Daily      tamsulosin 0.4 MG capsule 24 hr capsule  Commonly known as: FLOMAX   1 capsule, Oral, Daily             No Known Allergies    Discharge Disposition:  Home or Self Care    Diet:  Hospital:  Diet Order   Procedures   • Diet Regular       Discharge Activity:   Activity Instructions     Activity as Tolerated            CODE STATUS:  Code Status and Medical Interventions:   Ordered at: 06/04/22 0556     Level Of Support Discussed With:    Patient     Code Status (Patient has no pulse and is not breathing):    CPR (Attempt to Resuscitate)     Medical Interventions (Patient has pulse or is breathing):    Full Support         No future appointments.    Additional Instructions for the Follow-ups that You Need to Schedule     Discharge Follow-up with PCP   As directed       Currently Documented PCP:    Provider, No Known    PCP Phone Number:    None     Follow Up Details: 3-5 days         Discharge Follow-up with Specified Provider: Neurology; 1 Month   As directed      To: Neurology    Follow Up: 1 Month               Pertinent  and/or Most Recent Results     PROCEDURES:   None    LAB RESULTS:      Lab 06/04/22  1206   WBC 3.93   HEMOGLOBIN 13.5   HEMATOCRIT 40.5   PLATELETS 60*   NEUTROS ABS 2.37   IMMATURE GRANS (ABS) 0.02   LYMPHS ABS 0.90   MONOS ABS 0.47   EOS ABS 0.14   MCV 92.5         Lab 06/04/22  1206   SODIUM 135*   POTASSIUM 4.0   CHLORIDE 99   CO2 27.5   ANION GAP 8.5   BUN 8   CREATININE 0.82   EGFR 91.6   GLUCOSE 110*   CALCIUM 8.9   HEMOGLOBIN A1C 5.20         Lab 06/04/22  1206   TOTAL PROTEIN 7.4   ALBUMIN 4.10   GLOBULIN 3.3   ALT (SGPT) 15   AST (SGOT) 34   BILIRUBIN 1.1   ALK PHOS 78             Lab 06/04/22  1206   CHOLESTEROL 135   LDL CHOL 72   HDL CHOL 38*   TRIGLYCERIDES 140             Brief Urine Lab Results     None        Microbiology Results (last 10 days)     ** No results found for the last 240 hours. **          MRI Brain Without Contrast    Result  Date: 6/4/2022  Impression:   1. No acute intracranial process identified. 2. Findings suggestive of moderate chronic small vessel ischemic disease.      CALVIN GOODRICH MD       Electronically Signed and Approved By: CALVIN GOODRICH MD on 6/04/2022 at 13:52                  MRI Brain Without Contrast    Result Date: 6/4/2022  Narrative: PROCEDURE: MRI BRAIN WO CONTRAST  COMPARISON: Other, CT, CT HEAD WO CONTRAST STROKE PROTOCOL, 6/03/2022, 14:55.  INDICATIONS: Headache, visual disturbance, slurred speech      TECHNIQUE: A variety of imaging planes and parameters were utilized for visualization of suspected pathology.  Images were performed without contrast.  FINDINGS:  No acute infarction, intracranial hemorrhage, or extra-axial collection is identified.  The ventricles are normal in caliber, with no evidence of mass effect or midline shift.  The basal cisterns appear patent.  Scattered foci of periventricular and subcortical white matter FLAIR hyperintensities are nonspecific, but likely the sequela of moderate chronic small vessel ischemic disease.  The midline structures appear intact.  The globes orbits appear intact.  The intracranial vascular flow voids appear patent.      Impression:   1. No acute intracranial process identified. 2. Findings suggestive of moderate chronic small vessel ischemic disease.      CALVIN GOODRICH MD       Electronically Signed and Approved By: CALVIN GOODRICH MD on 6/04/2022 at 13:52               Time spent on Discharge including face to face service:  34 minutes    Electronically signed by Mirza Hurley MD, 06/05/22, 10:42 AM EDT.

## 2022-06-05 NOTE — PLAN OF CARE
Goal Outcome Evaluation:      NIH remains zero. No changes noted throughout the night. Patient hoping to discharge home today.

## 2022-06-08 ENCOUNTER — READMISSION MANAGEMENT (OUTPATIENT)
Dept: CALL CENTER | Facility: HOSPITAL | Age: 77
End: 2022-06-08

## 2022-06-08 NOTE — OUTREACH NOTE
Stroke Week 1 Survey    Flowsheet Row Responses   [facility] patient discharged from? [facility]   Does the patient have one of the following disease processes/diagnoses(primary or secondary)? Stroke (TIA)   Week 1 attempt successful? Yes   Call start time 0914   Call end time 0918   Discharge diagnosis TIA (transient ischemic attack)   Person spoke with today (if not patient) and relationship Madai-spouse    Meds reviewed with patient/caregiver? Yes   Is the patient having any side effects they believe may be caused by any medication additions or changes? No   Does the patient have all medications ordered at discharge? Yes   Is the patient taking all medications as directed (includes completed medication regime)? No   What is preventing the patient from taking all medications as directed? Other  [one medication was ordered. Wife is in [place] so she's not sure if it's in or not. ]   Nursing Interventions Nurse provided patient education   Does the patient have a primary care provider?  Yes   Does the patient have an appointment with their PCP within 7 days of discharge? No   Comments regarding PCP PCP was updated   What is preventing the patient from scheduling follow up appointments within 7 days of discharge? Haven't had time   Nursing Interventions Advised patient to make appointment   Has the patient kept scheduled appointments due by today? N/A   Psychosocial issues? No   Does the patient require any assistance with activities of daily living such as eating, bathing, dressing, walking, etc.? No   Does the patient have any residual symptoms from stroke/TIA? No   Did the patient receive a copy of their discharge instructions? Yes   Nursing interventions Reviewed instructions with patient   What is the patient's perception of their health status since discharge? Improving   Nursing interventions Nurse provided patient education   Is the patient able to teach back FAST for Stroke? Yes   Is the patient/caregiver  able to teach back the risk factors for a stroke? High blood pressure-goal below 120/80, Smoking   Is the patient/caregiver able to teach back signs and symptoms related to disease process for when to call PCP? Yes   Is the patient/caregiver able to teach back signs and symptoms related to disease process for when to call 911? Yes   If the patient is a current smoker, are they able to teach back resources for cessation? Not a smoker   Is the patient/caregiver able to teach back the hierarchy of who to call/visit for symptoms/problems? PCP, Specialist, Home health nurse, Urgent Care, ED, 911 Yes   Week 1 call completed? Yes          TOO MCCRACKEN - Registered Nurse

## 2022-06-15 ENCOUNTER — READMISSION MANAGEMENT (OUTPATIENT)
Dept: CALL CENTER | Facility: HOSPITAL | Age: 77
End: 2022-06-15

## 2022-06-15 NOTE — OUTREACH NOTE
Stroke Week 2 Survey    Flowsheet Row Responses   [facility] discharged from? [facility]   Does the patient have one of the following disease processes/diagnoses(primary or secondary)? Stroke (TIA)   Week 2 attempt successful? No   Unsuccessful attempts Attempt 1          CONSUELO CORADO - Registered Nurse

## 2022-06-17 ENCOUNTER — READMISSION MANAGEMENT (OUTPATIENT)
Dept: CALL CENTER | Facility: HOSPITAL | Age: 77
End: 2022-06-17

## 2022-06-17 NOTE — OUTREACH NOTE
Stroke Week 2 Survey    Flowsheet Row Responses   [facility] patient discharged from? [facility]   Does the patient have one of the following disease processes/diagnoses(primary or secondary)? Stroke (TIA)   Week 2 attempt successful? Yes   Call start time 1253   Call end time 1302   Discharge diagnosis TIA (transient ischemic attack)   Person spoke with today (if not patient) and relationship patient   Meds reviewed with patient/caregiver? Yes   Does the patient have all medications ordered at discharge? Yes   Is the patient taking all medications as directed (includes completed medication regime)? Yes   Comments regarding appointments Patient reports that he has neuro follow up in July   Does the patient have a primary care provider?  Yes   Comments regarding PCP Patient reports that he followed up with PCP post discharge    Has the patient kept scheduled appointments due by today? Yes   Has home health visited the patient within 72 hours of discharge? N/A   Psychosocial issues? No   Does the patient require any assistance with activities of daily living such as eating, bathing, dressing, walking, etc.? No   Does the patient have any residual symptoms from stroke/TIA? No   Does the patient understand the diet ordered at discharge? Yes   Did the patient receive a copy of their discharge instructions? Yes   Nursing interventions Reviewed instructions with patient   What is the patient's perception of their health status since discharge? Returned to baseline/stable   Nursing interventions Nurse provided patient education   Is the patient able to teach back FAST for Stroke? Yes   Is the patient/caregiver able to teach back the risk factors for a stroke? High blood pressure-goal below 120/80   Is the patient/caregiver able to teach back signs and symptoms related to disease process for when to call PCP? Yes   Is the patient/caregiver able to teach back signs and symptoms related to disease process for when to call  911? Yes   If the patient is a current smoker, are they able to teach back resources for cessation? Not a smoker   Is the patient/caregiver able to teach back the hierarchy of who to call/visit for symptoms/problems? PCP, Specialist, Home health nurse, Urgent Care, ED, 911 Yes   Week 2 call completed? Yes   Revoked No further contact(revokes)-requires comment   Is the patient interested in additional calls from an ambulatory ?  NOTE:  applies to high risk patients requiring additional follow-up. No   Graduated/Revoked comments Patient reports doing well. Denies any new questions or concerns. Reports that he is currently in [place]. No further calls.           LILIAN YO - Registered Nurse

## 2023-05-08 ENCOUNTER — OFFICE VISIT (OUTPATIENT)
Dept: UROLOGY | Facility: CLINIC | Age: 78
End: 2023-05-08
Payer: MEDICARE

## 2023-05-08 VITALS — WEIGHT: 204 LBS | HEIGHT: 65 IN | RESPIRATION RATE: 12 BRPM | BODY MASS INDEX: 33.99 KG/M2

## 2023-05-08 DIAGNOSIS — N13.8 BPH WITH OBSTRUCTION/LOWER URINARY TRACT SYMPTOMS: Primary | ICD-10-CM

## 2023-05-08 DIAGNOSIS — N40.1 BPH WITH OBSTRUCTION/LOWER URINARY TRACT SYMPTOMS: Primary | ICD-10-CM

## 2023-05-08 PROBLEM — N40.0 BPH (BENIGN PROSTATIC HYPERPLASIA): Status: ACTIVE | Noted: 2022-06-04

## 2023-05-08 PROBLEM — G47.30 SLEEP APNEA: Status: ACTIVE | Noted: 2022-06-04

## 2023-05-08 PROBLEM — D69.3 CHRONIC ITP (IDIOPATHIC THROMBOCYTOPENIA): Status: ACTIVE | Noted: 2020-01-01

## 2023-05-08 PROBLEM — K76.0 FATTY LIVER DISEASE, NONALCOHOLIC: Status: ACTIVE | Noted: 2023-05-08

## 2023-05-08 PROBLEM — K21.9 GERD (GASTROESOPHAGEAL REFLUX DISEASE): Status: ACTIVE | Noted: 2023-05-08

## 2023-05-08 PROBLEM — K63.5 COLON POLYP: Status: ACTIVE | Noted: 2023-01-19

## 2023-05-08 LAB
BILIRUB BLD-MCNC: NEGATIVE MG/DL
CLARITY, POC: CLEAR
COLOR UR: YELLOW
EXPIRATION DATE: NORMAL
GLUCOSE UR STRIP-MCNC: NEGATIVE MG/DL
KETONES UR QL: NEGATIVE
LEUKOCYTE EST, POC: NEGATIVE
Lab: NORMAL
NITRITE UR-MCNC: NEGATIVE MG/ML
PH UR: 5.5 [PH] (ref 5–8)
PROT UR STRIP-MCNC: NEGATIVE MG/DL
RBC # UR STRIP: NEGATIVE /UL
SP GR UR: 1.02 (ref 1–1.03)
URINE VOLUME: 37
UROBILINOGEN UR QL: NORMAL

## 2023-05-08 PROCEDURE — 51798 US URINE CAPACITY MEASURE: CPT | Performed by: NURSE PRACTITIONER

## 2023-05-08 PROCEDURE — 81003 URINALYSIS AUTO W/O SCOPE: CPT | Performed by: NURSE PRACTITIONER

## 2023-05-08 PROCEDURE — 99204 OFFICE O/P NEW MOD 45 MIN: CPT | Performed by: NURSE PRACTITIONER

## 2023-05-08 PROCEDURE — 1160F RVW MEDS BY RX/DR IN RCRD: CPT | Performed by: NURSE PRACTITIONER

## 2023-05-08 PROCEDURE — 1159F MED LIST DOCD IN RCRD: CPT | Performed by: NURSE PRACTITIONER

## 2023-05-08 RX ORDER — TAMSULOSIN HYDROCHLORIDE 0.4 MG/1
2 CAPSULE ORAL DAILY
Qty: 180 CAPSULE | Refills: 4 | Status: SHIPPED | OUTPATIENT
Start: 2023-05-08

## 2023-05-08 RX ORDER — TAMSULOSIN HYDROCHLORIDE 0.4 MG/1
2 CAPSULE ORAL DAILY
Qty: 180 CAPSULE | Refills: 4 | Status: SHIPPED | OUTPATIENT
Start: 2023-05-08 | End: 2023-05-08

## 2023-05-08 RX ORDER — HYDROXYZINE PAMOATE 25 MG/1
CAPSULE ORAL
COMMUNITY
Start: 2023-04-11

## 2023-05-08 RX ORDER — ERGOCALCIFEROL 1.25 MG/1
CAPSULE ORAL
COMMUNITY
Start: 2023-05-03

## 2023-05-08 NOTE — PROGRESS NOTES
Chief Complaint: Benign Prostatic Hypertrophy (Pt here for follow up.  )    Subjective         History of Present Illness  López Lee is a 77 y.o. male presents to [facility] to be seen for f/u BPH.    The patient states that for the last several years he has had nocturia x 2-3     He states urgency with urination during the daytime.     urinary stream is weak at times.     He denies straining to void.     States hesitation with urination.     He states intermittent stream and postvoid dribble.     He has been on tamsulosin 0.4mg q day for at least 6 months but states no significant change in symptoms.    PSA on 4/26/23 was 1.2    He drinks coffee in the AM and sprite in the afternoons no significant caffeine intake.    Father had prostate cancer dx in his early 70s.         Objective     Past Medical History:   Diagnosis Date   • Enlarged prostate    • Hypertension    • Irritable bowel syndrome (IBS)    • Sleep apnea        Past Surgical History:   Procedure Laterality Date   • EYE SURGERY     • TONSILLECTOMY           Current Outpatient Medications:   •  aspirin 81 MG chewable tablet, Chew 1 tablet Daily., Disp: 30 tablet, Rfl: 0  •  atenolol (TENORMIN) 25 MG tablet, Take 1 tablet by mouth 2 (Two) Times a Day., Disp: , Rfl:   •  atorvastatin (LIPITOR) 80 MG tablet, Take 1 tablet by mouth Every Night., Disp: 30 tablet, Rfl: 0  •  carbidopa-levodopa (SINEMET)  MG per tablet, Take 2 tablets by mouth Every Evening., Disp: , Rfl:   •  Choline Bitartrate (CHOLINE PO), Take 3 tablets by mouth 2 (Two) Times a Day., Disp: , Rfl:   •  citalopram (CeleXA) 40 MG tablet, Take 1 tablet by mouth Daily., Disp: , Rfl:   •  fluticasone (FLONASE) 50 MCG/ACT nasal spray, 2 sprays into the nostril(s) as directed by provider Daily., Disp: , Rfl:   •  hydrOXYzine pamoate (VISTARIL) 25 MG capsule, , Disp: , Rfl:   •  losartan (COZAAR) 100 MG tablet, Take 1 tablet by mouth Daily., Disp: , Rfl:   •   "montelukast (SINGULAIR) 10 MG tablet, Take 1 tablet by mouth Daily., Disp: , Rfl:   •  pantoprazole (PROTONIX) 40 MG EC tablet, Take 1 tablet by mouth Daily., Disp: , Rfl:   •  tamsulosin (FLOMAX) 0.4 MG capsule 24 hr capsule, Take 2 capsules by mouth Daily., Disp: 180 capsule, Rfl: 4  •  vitamin D (ERGOCALCIFEROL) 1.25 MG (91044 UT) capsule capsule, , Disp: , Rfl:   •  butalbital-acetaminophen-caffeine (ORBIVAN) -40 MG capsule capsule, Take 1 capsule by mouth Every 6 (Six) Hours As Needed (headache)., Disp: 16 capsule, Rfl: 0    No Known Allergies     History reviewed. No pertinent family history.    Social History     Socioeconomic History   • Marital status:    Tobacco Use   • Smoking status: Never     Passive exposure: Never   • Smokeless tobacco: Never   Vaping Use   • Vaping Use: Never used   Substance and Sexual Activity   • Alcohol use: Defer   • Drug use: Defer   • Sexual activity: Defer       Vital Signs:   Resp 12   Ht 165.1 cm (65\")   Wt 92.5 kg (204 lb)   BMI 33.95 kg/m²      Physical Exam     Result Review :   The following data was reviewed by: GURMEET Heredia on 05/08/2023:  Results for orders placed or performed in visit on 05/08/23   Bladder Scan   Result Value Ref Range    Urine Volume 37    POC Urinalysis Dipstick, Automated    Specimen: Urine   Result Value Ref Range    Color Yellow Yellow, Straw, Dark Yellow, Cesia    Clarity, UA Clear Clear    Specific Gravity  1.025 1.005 - 1.030    pH, Urine 5.5 5.0 - 8.0    Leukocytes Negative Negative    Nitrite, UA Negative Negative    Protein, POC Negative Negative mg/dL    Glucose, UA Negative Negative mg/dL    Ketones, UA Negative Negative    Urobilinogen, UA Normal Normal, 0.2 E.U./dL    Bilirubin Negative Negative    Blood, UA Negative Negative    Lot Number [id number]     Expiration Date 2,024/4         Bladder Scan interpretation 05/08/2023    Estimation of residual urine via BVI 3000 Verathon Bladder Scan  MA/nurse performing: " Luis Daniel LAMAR RN   Residual Urine: 37 ml  Indication: BPH with obstruction/lower urinary tract symptoms   Position: Supine  Examination: Incremental scanning of the suprapubic area using 2.0 MHz transducer using copious amounts of acoustic gel.   Findings: An anechoic area was demonstrated which represented the bladder, with measurement of residual urine as noted. I inspected this myself. In that the residual urine was stable or insignificant, refer to plan for treatment and plan necessary at this time.         Procedures        Assessment and Plan    Diagnoses and all orders for this visit:    1. BPH with obstruction/lower urinary tract symptoms (Primary)  -     POC Urinalysis Dipstick, Automated  -     Bladder Scan  -     Discontinue: tamsulosin (FLOMAX) 0.4 MG capsule 24 hr capsule; Take 2 capsules by mouth Daily.  Dispense: 180 capsule; Refill: 4  -     tamsulosin (FLOMAX) 0.4 MG capsule 24 hr capsule; Take 2 capsules by mouth Daily.  Dispense: 180 capsule; Refill: 4      Given his symptoms I would like to have him increase his tamsulosin to 0.8mg q day.    Nocturia- Discussed with patient that nocturia is a common condition that is multifactorial in nature and can be difficult to treat, unlikely to completely irradicate, and management is dictated by patient motivation to improve and cope with symptoms.  discussed with patient, recommended behavioral modifications including fluid management, limiting fluids prior to sleep, and voiding immediately prior to sleep. Recommended that patient lay supine in the afternoon with feet above heart level to assist wtih mobilizing dependent edema which typically occurs while supine during sleep. Also, although no history of sleep apnea, could consider workup as studies have shown that with treatment of sleep apnea, nocuria can be significantly reduced. All questions addressed.         I spent 15 minutes caring for López on this date of service. This time includes time spent by me  in the following activities:reviewing tests, obtaining and/or reviewing a separately obtained history, performing a medically appropriate examination and/or evaluation , counseling and educating the patient/family/caregiver, ordering medications, tests, or procedures, and documenting information in the medical record  Follow Up   Return in about 3 months (around 8/8/2023) for f/u BPH increase tamsulosin with PVR.  Patient was given instructions and counseling regarding his condition or for health maintenance advice. Please see specific information pulled into the AVS if appropriate.         This document has been electronically signed by GURMEET Heredia  May 8, 2023 09:34 EDT

## 2023-08-09 ENCOUNTER — OFFICE VISIT (OUTPATIENT)
Dept: UROLOGY | Facility: CLINIC | Age: 78
End: 2023-08-09
Payer: MEDICARE

## 2023-08-09 VITALS — BODY MASS INDEX: 33.76 KG/M2 | HEIGHT: 65 IN | RESPIRATION RATE: 12 BRPM | WEIGHT: 202.6 LBS

## 2023-08-09 DIAGNOSIS — N13.8 BPH WITH OBSTRUCTION/LOWER URINARY TRACT SYMPTOMS: Primary | ICD-10-CM

## 2023-08-09 DIAGNOSIS — N40.1 BPH WITH OBSTRUCTION/LOWER URINARY TRACT SYMPTOMS: Primary | ICD-10-CM

## 2023-08-09 PROBLEM — D69.6 LOW PLATELET COUNT: Status: ACTIVE | Noted: 2020-06-02

## 2023-08-09 PROBLEM — F32.A DEPRESSIVE DISORDER: Status: ACTIVE | Noted: 2020-06-02

## 2023-08-09 PROBLEM — K58.9 IRRITABLE BOWEL SYNDROME: Status: ACTIVE | Noted: 2020-06-01

## 2023-08-09 PROBLEM — E55.9 VITAMIN D DEFICIENCY: Status: ACTIVE | Noted: 2020-06-02

## 2023-08-09 LAB
BILIRUB BLD-MCNC: NEGATIVE MG/DL
CLARITY, POC: CLEAR
COLOR UR: YELLOW
EXPIRATION DATE: ABNORMAL
GLUCOSE UR STRIP-MCNC: NEGATIVE MG/DL
KETONES UR QL: NEGATIVE
LEUKOCYTE EST, POC: NEGATIVE
Lab: ABNORMAL
NITRITE UR-MCNC: NEGATIVE MG/ML
PH UR: 5.5 [PH] (ref 5–8)
PROT UR STRIP-MCNC: NEGATIVE MG/DL
RBC # UR STRIP: ABNORMAL /UL
SP GR UR: 1.02 (ref 1–1.03)
URINE VOLUME: 22
UROBILINOGEN UR QL: NORMAL

## 2023-08-09 PROCEDURE — 99213 OFFICE O/P EST LOW 20 MIN: CPT | Performed by: NURSE PRACTITIONER

## 2023-08-09 PROCEDURE — 1160F RVW MEDS BY RX/DR IN RCRD: CPT | Performed by: NURSE PRACTITIONER

## 2023-08-09 PROCEDURE — 1159F MED LIST DOCD IN RCRD: CPT | Performed by: NURSE PRACTITIONER

## 2023-08-09 PROCEDURE — 81003 URINALYSIS AUTO W/O SCOPE: CPT | Performed by: NURSE PRACTITIONER

## 2023-08-09 RX ORDER — AMOXICILLIN AND CLAVULANATE POTASSIUM 875; 125 MG/1; MG/1
1 TABLET, FILM COATED ORAL
COMMUNITY
Start: 2023-07-24 | End: 2023-08-09

## 2023-08-09 NOTE — PROGRESS NOTES
Chief Complaint: Benign Prostatic Hypertrophy (Pt here for follow up.  Pt states medication is not helping.  Pt still gets up 3-4 times at night.)    Subjective         History of Present Illness  López Lee is a 77 y.o. male presents to [facility] to be seen for f/u BPH.    Last visit we increased the patient's tamsulosin to 0.8 mg daily to see if this would help to alleviate some of his nocturia as well as urgency frequency and weak urinary stream.    The patient states that the medication is not helping his symptoms at this time.    He states the urgency has somewhat improved.     Stream is still weak and intermittent.    Nocturia still x 2-3.       Previous:  The patient states that for the last several years he has had nocturia x 2-3     He states urgency with urination during the daytime.     urinary stream is weak at times.     He denies straining to void.     States hesitation with urination.     He states intermittent stream and postvoid dribble.     He has been on tamsulosin 0.4mg q day for at least 6 months but states no significant change in symptoms.    PSA on 4/26/23 was 1.2    He drinks coffee in the AM and sprite in the afternoons no significant caffeine intake.    Father had prostate cancer dx in his early 70s.         Objective     Past Medical History:   Diagnosis Date    Enlarged prostate     Hypertension     Irritable bowel syndrome (IBS)     Sleep apnea        Past Surgical History:   Procedure Laterality Date    EYE SURGERY      TONSILLECTOMY           Current Outpatient Medications:     aspirin 81 MG chewable tablet, Chew 1 tablet Daily., Disp: 30 tablet, Rfl: 0    atenolol (TENORMIN) 25 MG tablet, Take 1 tablet by mouth 2 (Two) Times a Day., Disp: , Rfl:     atorvastatin (LIPITOR) 80 MG tablet, Take 1 tablet by mouth Every Night., Disp: 30 tablet, Rfl: 0    carbidopa-levodopa (SINEMET)  MG per tablet, Take 2 tablets by mouth Every Evening., Disp: , Rfl:     " Choline Bitartrate (CHOLINE PO), Take 3 tablets by mouth 2 (Two) Times a Day., Disp: , Rfl:     citalopram (CeleXA) 40 MG tablet, Take 1 tablet by mouth Daily., Disp: , Rfl:     fluticasone (FLONASE) 50 MCG/ACT nasal spray, 2 sprays into the nostril(s) as directed by provider Daily., Disp: , Rfl:     hydrOXYzine pamoate (VISTARIL) 25 MG capsule, , Disp: , Rfl:     losartan (COZAAR) 100 MG tablet, Take 1 tablet by mouth Daily., Disp: , Rfl:     montelukast (SINGULAIR) 10 MG tablet, Take 1 tablet by mouth Daily., Disp: , Rfl:     pantoprazole (PROTONIX) 40 MG EC tablet, Take 1 tablet by mouth Daily., Disp: , Rfl:     tamsulosin (FLOMAX) 0.4 MG capsule 24 hr capsule, Take 2 capsules by mouth Daily., Disp: 180 capsule, Rfl: 4    vitamin D (ERGOCALCIFEROL) 1.25 MG (56643 UT) capsule capsule, , Disp: , Rfl:     No Known Allergies     History reviewed. No pertinent family history.    Social History     Socioeconomic History    Marital status:    Tobacco Use    Smoking status: Never     Passive exposure: Never    Smokeless tobacco: Never   Vaping Use    Vaping Use: Never used   Substance and Sexual Activity    Alcohol use: Defer    Drug use: Defer    Sexual activity: Defer       Vital Signs:   Resp 12   Ht 165.1 cm (65\")   Wt 91.9 kg (202 lb 9.6 oz)   BMI 33.71 kg/mý      Physical Exam     Result Review :   The following data was reviewed by: GURMEET Heredia on 08/09/2023:  Results for orders placed or performed in visit on 08/09/23   Bladder Scan   Result Value Ref Range    Urine Volume 22    POC Urinalysis Dipstick, Automated    Specimen: Urine   Result Value Ref Range    Color Yellow Yellow, Straw, Dark Yellow, Cesia    Clarity, UA Clear Clear    Specific Gravity  1.025 1.005 - 1.030    pH, Urine 5.5 5.0 - 8.0    Leukocytes Negative Negative    Nitrite, UA Negative Negative    Protein, POC Negative Negative mg/dL    Glucose, UA Negative Negative mg/dL    Ketones, UA Negative Negative    Urobilinogen, UA " Normal Normal, 0.2 E.U./dL    Bilirubin Negative Negative    Blood, UA Trace (A) Negative    Lot Number [id number]     Expiration Date 2,024/8         Bladder Scan interpretation 08/09/2023    Estimation of residual urine via BVI 3000 Verathon Bladder Scan  MA/nurse performing: Dania mendez ma  Residual Urine: 22 ml  Indication: BPH with obstruction/lower urinary tract symptoms   Position: Supine  Examination: Incremental scanning of the suprapubic area using 2.0 MHz transducer using copious amounts of acoustic gel.   Findings: An anechoic area was demonstrated which represented the bladder, with measurement of residual urine as noted. I inspected this myself. In that the residual urine was stable or insignificant, refer to plan for treatment and plan necessary at this time.         Procedures        Assessment and Plan    Diagnoses and all orders for this visit:    1. BPH with obstruction/lower urinary tract symptoms (Primary)  -     POC Urinalysis Dipstick, Automated  -     Bladder Scan  -     Cystometrogram; Future        Discussed options for treatment at this time.    We did discuss adding finasteride to his plan of care however given side effect profile and length of time it takes to reach efficacy the patient would not like to proceed with this intervention at this time.    Did discuss proceeding with a urodynamics test to delineate underlying etiology of his urinary symptoms and be able to effectively perform plan of care patient is agreeable and we will schedule him for this today.    I spent 10 minutes caring for López on this date of service. This time includes time spent by me in the following activities:reviewing tests, obtaining and/or reviewing a separately obtained history, performing a medically appropriate examination and/or evaluation , counseling and educating the patient/family/caregiver, ordering medications, tests, or procedures, and documenting information in the medical record  Follow Up   Return  for uds f/u 1 week after.  Patient was given instructions and counseling regarding his condition or for health maintenance advice. Please see specific information pulled into the AVS if appropriate.         This document has been electronically signed by GURMEET Heredia  August 9, 2023 09:14 EDT

## 2023-10-16 ENCOUNTER — OFFICE VISIT (OUTPATIENT)
Dept: UROLOGY | Facility: CLINIC | Age: 78
End: 2023-10-16
Payer: MEDICARE

## 2023-10-16 VITALS
RESPIRATION RATE: 12 BRPM | HEART RATE: 86 BPM | BODY MASS INDEX: 33.05 KG/M2 | WEIGHT: 198.4 LBS | DIASTOLIC BLOOD PRESSURE: 64 MMHG | SYSTOLIC BLOOD PRESSURE: 147 MMHG | HEIGHT: 65 IN

## 2023-10-16 DIAGNOSIS — N13.8 BPH WITH OBSTRUCTION/LOWER URINARY TRACT SYMPTOMS: ICD-10-CM

## 2023-10-16 DIAGNOSIS — N40.1 BPH WITH OBSTRUCTION/LOWER URINARY TRACT SYMPTOMS: ICD-10-CM

## 2023-10-16 LAB
BILIRUB BLD-MCNC: NEGATIVE MG/DL
CLARITY, POC: CLEAR
COLOR UR: NORMAL
EXPIRATION DATE: NORMAL
GLUCOSE UR STRIP-MCNC: NEGATIVE MG/DL
KETONES UR QL: NEGATIVE
LEUKOCYTE EST, POC: NEGATIVE
Lab: NORMAL
NITRITE UR-MCNC: NEGATIVE MG/ML
PH UR: 6 [PH] (ref 5–8)
PROT UR STRIP-MCNC: NEGATIVE MG/DL
RBC # UR STRIP: NEGATIVE /UL
SP GR UR: 1.02 (ref 1–1.03)
URINE VOLUME: 53
UROBILINOGEN UR QL: NORMAL

## 2023-10-16 PROCEDURE — 3077F SYST BP >= 140 MM HG: CPT | Performed by: NURSE PRACTITIONER

## 2023-10-16 PROCEDURE — 3078F DIAST BP <80 MM HG: CPT | Performed by: NURSE PRACTITIONER

## 2023-10-16 PROCEDURE — 99214 OFFICE O/P EST MOD 30 MIN: CPT | Performed by: NURSE PRACTITIONER

## 2023-10-16 PROCEDURE — 1160F RVW MEDS BY RX/DR IN RCRD: CPT | Performed by: NURSE PRACTITIONER

## 2023-10-16 PROCEDURE — 51798 US URINE CAPACITY MEASURE: CPT | Performed by: NURSE PRACTITIONER

## 2023-10-16 PROCEDURE — 81003 URINALYSIS AUTO W/O SCOPE: CPT | Performed by: NURSE PRACTITIONER

## 2023-10-16 PROCEDURE — 1159F MED LIST DOCD IN RCRD: CPT | Performed by: NURSE PRACTITIONER

## 2023-10-16 RX ORDER — FINASTERIDE 5 MG/1
5 TABLET, FILM COATED ORAL DAILY
Qty: 90 TABLET | Refills: 3 | Status: SHIPPED | OUTPATIENT
Start: 2023-10-16

## 2023-10-16 RX ORDER — DICYCLOMINE HYDROCHLORIDE 10 MG/1
10 CAPSULE ORAL
COMMUNITY
Start: 2023-10-10 | End: 2024-10-09

## 2023-10-16 NOTE — PROGRESS NOTES
Chief Complaint: Benign Prostatic Hypertrophy (Pt here for follow up.)    Subjective         History of Present Illness  López Lee is a 78 y.o. male presents to [facility] to be seen for f/u BPH.    Last visit we discussed getting patient set up for urodynamics test to evaluate etiology of his lower urinary tract symptoms.    Unfortunately we have not been able to get him scheduled for urodynamics test.    His symptoms remain unchanged.    Previous:  Last visit we increased the patient's tamsulosin to 0.8 mg daily to see if this would help to alleviate some of his nocturia as well as urgency frequency and weak urinary stream.    The patient states that the medication is not helping his symptoms at this time.    He states the urgency has somewhat improved.     Stream is still weak and intermittent.    Nocturia still x 2-3.     The patient states that for the last several years he has had nocturia x 2-3     He states urgency with urination during the daytime.     urinary stream is weak at times.     He denies straining to void.     States hesitation with urination.     He states intermittent stream and postvoid dribble.     He has been on tamsulosin 0.4mg q day for at least 6 months but states no significant change in symptoms.    PSA on 4/26/23 was 1.2    He drinks coffee in the AM and sprite in the afternoons no significant caffeine intake.    Father had prostate cancer dx in his early 70s.         Objective     Past Medical History:   Diagnosis Date    Enlarged prostate     Hypertension     Irritable bowel syndrome (IBS)     Sleep apnea        Past Surgical History:   Procedure Laterality Date    EYE SURGERY      TONSILLECTOMY           Current Outpatient Medications:     aspirin 81 MG chewable tablet, Chew 1 tablet Daily., Disp: 30 tablet, Rfl: 0    atenolol (TENORMIN) 25 MG tablet, Take 1 tablet by mouth 2 (Two) Times a Day., Disp: , Rfl:     atorvastatin (LIPITOR) 80 MG tablet,  "Take 1 tablet by mouth Every Night., Disp: 30 tablet, Rfl: 0    carbidopa-levodopa (SINEMET)  MG per tablet, Take 2 tablets by mouth Every Evening., Disp: , Rfl:     Choline Bitartrate (CHOLINE PO), Take 3 tablets by mouth 2 (Two) Times a Day., Disp: , Rfl:     citalopram (CeleXA) 40 MG tablet, Take 1 tablet by mouth Daily., Disp: , Rfl:     dicyclomine (BENTYL) 10 MG capsule, Take 1 capsule by mouth., Disp: , Rfl:     fluticasone (FLONASE) 50 MCG/ACT nasal spray, 2 sprays into the nostril(s) as directed by provider Daily., Disp: , Rfl:     hydrOXYzine pamoate (VISTARIL) 25 MG capsule, , Disp: , Rfl:     losartan (COZAAR) 100 MG tablet, Take 1 tablet by mouth Daily., Disp: , Rfl:     montelukast (SINGULAIR) 10 MG tablet, Take 1 tablet by mouth Daily., Disp: , Rfl:     pantoprazole (PROTONIX) 40 MG EC tablet, Take 1 tablet by mouth Daily., Disp: , Rfl:     tamsulosin (FLOMAX) 0.4 MG capsule 24 hr capsule, Take 2 capsules by mouth Daily., Disp: 180 capsule, Rfl: 4    vitamin D (ERGOCALCIFEROL) 1.25 MG (90600 UT) capsule capsule, , Disp: , Rfl:     finasteride (PROSCAR) 5 MG tablet, Take 1 tablet by mouth Daily., Disp: 90 tablet, Rfl: 3    No Known Allergies     History reviewed. No pertinent family history.    Social History     Socioeconomic History    Marital status:    Tobacco Use    Smoking status: Never     Passive exposure: Never    Smokeless tobacco: Never   Vaping Use    Vaping Use: Never used   Substance and Sexual Activity    Alcohol use: Defer    Drug use: Defer    Sexual activity: Defer       Vital Signs:   /64 (BP Location: Left arm, Patient Position: Sitting)   Pulse 86   Resp 12   Ht 165.1 cm (65\")   Wt 90 kg (198 lb 6.4 oz)   BMI 33.02 kg/m²      Physical Exam     Result Review :   The following data was reviewed by: GURMEET Heredia on 10/16/2023:  Results for orders placed or performed in visit on 10/16/23   Bladder Scan   Result Value Ref Range    Urine Volume 53    POC " Urinalysis Dipstick, Automated    Specimen: Urine   Result Value Ref Range    Color Dark Yellow Yellow, Straw, Dark Yellow, Cesia    Clarity, UA Clear Clear    Specific Gravity  1.020 1.005 - 1.030    pH, Urine 6.0 5.0 - 8.0    Leukocytes Negative Negative    Nitrite, UA Negative Negative    Protein, POC Negative Negative mg/dL    Glucose, UA Negative Negative mg/dL    Ketones, UA Negative Negative    Urobilinogen, UA Normal Normal, 0.2 E.U./dL    Bilirubin Negative Negative    Blood, UA Negative Negative    Lot Number [id number]     Expiration Date 2,024/9         Bladder Scan interpretation 10/16/2023    Estimation of residual urine via [employer] 3000 Verathon Bladder Scan  MA/nurse performing: Dania mendez ma  Residual Urine: 53 ml  Indication: BPH with obstruction/lower urinary tract symptoms   Position: Supine  Examination: Incremental scanning of the suprapubic area using 2.0 MHz transducer using copious amounts of acoustic gel.   Findings: An anechoic area was demonstrated which represented the bladder, with measurement of residual urine as noted. I inspected this myself. In that the residual urine was stable or insignificant, refer to plan for treatment and plan necessary at this time.         Procedures        Assessment and Plan    Diagnoses and all orders for this visit:    1. BPH with obstruction/lower urinary tract symptoms  -     Bladder Scan  -     POC Urinalysis Dipstick, Automated  -     finasteride (PROSCAR) 5 MG tablet; Take 1 tablet by mouth Daily.  Dispense: 90 tablet; Refill: 3      We will try him on finasteride to see if this helps with his nocturia and weak stream.    I spent 10 minutes caring for López on this date of service. This time includes time spent by me in the following activities:reviewing tests, obtaining and/or reviewing a separately obtained history, performing a medically appropriate examination and/or evaluation , counseling and educating the patient/family/caregiver, ordering  medications, tests, or procedures, and documenting information in the medical record  Follow Up   Return in about 4 months (around 2/16/2024) for f/u BPH with PVR .  Patient was given instructions and counseling regarding his condition or for health maintenance advice. Please see specific information pulled into the AVS if appropriate.         This document has been electronically signed by GURMEET Heredia  October 16, 2023 10:41 EDT

## 2023-10-18 ENCOUNTER — TELEPHONE (OUTPATIENT)
Dept: UROLOGY | Facility: CLINIC | Age: 78
End: 2023-10-18
Payer: MEDICARE

## 2024-02-19 ENCOUNTER — OFFICE VISIT (OUTPATIENT)
Dept: UROLOGY | Facility: CLINIC | Age: 79
End: 2024-02-19
Payer: MEDICARE

## 2024-02-19 VITALS
WEIGHT: 206.4 LBS | HEIGHT: 65 IN | BODY MASS INDEX: 34.39 KG/M2 | HEART RATE: 48 BPM | SYSTOLIC BLOOD PRESSURE: 141 MMHG | DIASTOLIC BLOOD PRESSURE: 63 MMHG

## 2024-02-19 DIAGNOSIS — R39.15 URINARY URGENCY: ICD-10-CM

## 2024-02-19 DIAGNOSIS — N40.1 BPH WITH OBSTRUCTION/LOWER URINARY TRACT SYMPTOMS: Primary | ICD-10-CM

## 2024-02-19 DIAGNOSIS — N13.8 BPH WITH OBSTRUCTION/LOWER URINARY TRACT SYMPTOMS: Primary | ICD-10-CM

## 2024-02-19 PROBLEM — R53.83 MALAISE AND FATIGUE: Status: ACTIVE | Noted: 2019-11-01

## 2024-02-19 PROBLEM — R53.81 MALAISE AND FATIGUE: Status: ACTIVE | Noted: 2019-11-01

## 2024-02-19 PROBLEM — R00.1 BRADYCARDIA: Status: ACTIVE | Noted: 2024-01-04

## 2024-02-19 PROBLEM — R42 DISEQUILIBRIUM: Status: ACTIVE | Noted: 2024-01-04

## 2024-02-19 PROBLEM — R73.9 ACUTE HYPERGLYCEMIA: Status: ACTIVE | Noted: 2020-06-02

## 2024-02-19 PROBLEM — R94.31 ABNORMAL ELECTROCARDIOGRAM: Status: ACTIVE | Noted: 2024-01-04

## 2024-02-19 PROBLEM — R74.01 ELEVATION OF LEVELS OF LIVER TRANSAMINASE LEVELS: Status: ACTIVE | Noted: 2020-06-02

## 2024-02-19 PROBLEM — R00.2 PALPITATIONS: Status: ACTIVE | Noted: 2024-01-04

## 2024-02-19 PROBLEM — R07.2 PRECORDIAL PAIN: Status: ACTIVE | Noted: 2024-01-04

## 2024-02-19 PROBLEM — J30.9 ALLERGIC RHINITIS: Status: ACTIVE | Noted: 2020-06-02

## 2024-02-19 LAB
BILIRUB BLD-MCNC: NEGATIVE MG/DL
CLARITY, POC: CLEAR
COLOR UR: YELLOW
EXPIRATION DATE: NORMAL
GLUCOSE UR STRIP-MCNC: NEGATIVE MG/DL
KETONES UR QL: NEGATIVE
LEUKOCYTE EST, POC: NEGATIVE
Lab: NORMAL
NITRITE UR-MCNC: NEGATIVE MG/ML
PH UR: 5.5 [PH] (ref 5–8)
PROT UR STRIP-MCNC: NEGATIVE MG/DL
RBC # UR STRIP: NEGATIVE /UL
SP GR UR: 1.03 (ref 1–1.03)
URINE VOLUME: 20
UROBILINOGEN UR QL: NORMAL

## 2024-02-19 PROCEDURE — 3077F SYST BP >= 140 MM HG: CPT | Performed by: NURSE PRACTITIONER

## 2024-02-19 PROCEDURE — 99214 OFFICE O/P EST MOD 30 MIN: CPT | Performed by: NURSE PRACTITIONER

## 2024-02-19 PROCEDURE — 1160F RVW MEDS BY RX/DR IN RCRD: CPT | Performed by: NURSE PRACTITIONER

## 2024-02-19 PROCEDURE — 81003 URINALYSIS AUTO W/O SCOPE: CPT | Performed by: NURSE PRACTITIONER

## 2024-02-19 PROCEDURE — 1159F MED LIST DOCD IN RCRD: CPT | Performed by: NURSE PRACTITIONER

## 2024-02-19 PROCEDURE — 51798 US URINE CAPACITY MEASURE: CPT | Performed by: NURSE PRACTITIONER

## 2024-02-19 PROCEDURE — 3078F DIAST BP <80 MM HG: CPT | Performed by: NURSE PRACTITIONER

## 2024-02-19 RX ORDER — METHYLPREDNISOLONE 4 MG/1
TABLET ORAL
COMMUNITY
Start: 2024-01-23 | End: 2024-02-19

## 2024-02-19 RX ORDER — AMLODIPINE BESYLATE 10 MG/1
10 TABLET ORAL DAILY
COMMUNITY
Start: 2023-12-19

## 2024-04-29 ENCOUNTER — OFFICE VISIT (OUTPATIENT)
Dept: UROLOGY | Facility: CLINIC | Age: 79
End: 2024-04-29
Payer: MEDICARE

## 2024-04-29 VITALS
DIASTOLIC BLOOD PRESSURE: 58 MMHG | WEIGHT: 204 LBS | TEMPERATURE: 98 F | HEIGHT: 65 IN | OXYGEN SATURATION: 96 % | BODY MASS INDEX: 33.99 KG/M2 | HEART RATE: 65 BPM | SYSTOLIC BLOOD PRESSURE: 129 MMHG

## 2024-04-29 DIAGNOSIS — R39.15 URINARY URGENCY: ICD-10-CM

## 2024-04-29 DIAGNOSIS — N40.1 BPH WITH OBSTRUCTION/LOWER URINARY TRACT SYMPTOMS: Primary | ICD-10-CM

## 2024-04-29 DIAGNOSIS — N13.8 BPH WITH OBSTRUCTION/LOWER URINARY TRACT SYMPTOMS: Primary | ICD-10-CM

## 2024-04-29 LAB
BILIRUB BLD-MCNC: NEGATIVE MG/DL
CLARITY, POC: CLEAR
COLOR UR: NORMAL
EXPIRATION DATE: NORMAL
GLUCOSE UR STRIP-MCNC: NEGATIVE MG/DL
KETONES UR QL: NEGATIVE
LEUKOCYTE EST, POC: NEGATIVE
Lab: NORMAL
NITRITE UR-MCNC: NEGATIVE MG/ML
PH UR: 5.5 [PH] (ref 5–8)
PROT UR STRIP-MCNC: NEGATIVE MG/DL
RBC # UR STRIP: NEGATIVE /UL
SP GR UR: 1.03 (ref 1–1.03)
URINE VOLUME: 18
UROBILINOGEN UR QL: NORMAL

## 2024-04-29 PROCEDURE — 1159F MED LIST DOCD IN RCRD: CPT | Performed by: NURSE PRACTITIONER

## 2024-04-29 PROCEDURE — 1160F RVW MEDS BY RX/DR IN RCRD: CPT | Performed by: NURSE PRACTITIONER

## 2024-04-29 PROCEDURE — 81003 URINALYSIS AUTO W/O SCOPE: CPT | Performed by: NURSE PRACTITIONER

## 2024-04-29 PROCEDURE — 3078F DIAST BP <80 MM HG: CPT | Performed by: NURSE PRACTITIONER

## 2024-04-29 PROCEDURE — 3074F SYST BP LT 130 MM HG: CPT | Performed by: NURSE PRACTITIONER

## 2024-04-29 PROCEDURE — 51798 US URINE CAPACITY MEASURE: CPT | Performed by: NURSE PRACTITIONER

## 2024-04-29 PROCEDURE — 99214 OFFICE O/P EST MOD 30 MIN: CPT | Performed by: NURSE PRACTITIONER

## 2024-04-29 RX ORDER — SULFAMETHOXAZOLE AND TRIMETHOPRIM 800; 160 MG/1; MG/1
TABLET ORAL
Qty: 6 TABLET | Refills: 0 | Status: SHIPPED | OUTPATIENT
Start: 2024-04-29

## 2024-04-29 RX ORDER — HYDROCODONE BITARTRATE AND ACETAMINOPHEN 5; 325 MG/1; MG/1
TABLET ORAL
COMMUNITY
Start: 2024-03-29 | End: 2024-04-29

## 2024-04-29 NOTE — PROGRESS NOTES
Chief Complaint: Follow-up (BPH with obstruction/lower urinary tract symptoms)    Subjective         History of Present Illness  López Lee is a 78 y.o. male presents to [facility] to be seen for f/u BPH.    Last visit started patient on Myrbetriq to see if this would help with urinary urgency as well as nocturia.    He is on tamsulosin 0.8 mg daily as well as finasteride.    He states that he is getting up 2-3 times still.    He has been switched to a bipap this year for sleep apnea.     He was taken off of atenolol and hr is better but still having palpitations.      Previous:   At last visit we started patient on finasteride in conjunction with has tamsulosin. No side effects.    Stream is still weak at times.     Urgency is improved mostly when he has to have a BM.    Nocturia x 2-3.    His hr is slower. He is seeing cardiology he has a f/u on 2/22.    Last visit we discussed getting patient set up for urodynamics test to evaluate etiology of his lower urinary tract symptoms.    Unfortunately we have not been able to get him scheduled for urodynamics test.    His symptoms remain unchanged.    Last visit we increased the patient's tamsulosin to 0.8 mg daily to see if this would help to alleviate some of his nocturia as well as urgency frequency and weak urinary stream.    The patient states that the medication is not helping his symptoms at this time.    He states the urgency has somewhat improved.     Stream is still weak and intermittent.    Nocturia still x 2-3.     The patient states that for the last several years he has had nocturia x 2-3     He states urgency with urination during the daytime.     urinary stream is weak at times.     He denies straining to void.     States hesitation with urination.     He states intermittent stream and postvoid dribble.     He has been on tamsulosin 0.4mg q day for at least 6 months but states no significant change in symptoms.    PSA on  4/26/23 was 1.2    He drinks coffee in the AM and sprite in the afternoons no significant caffeine intake.    Father had prostate cancer dx in his early 70s.         Objective     Past Medical History:   Diagnosis Date    Benign prostatic hyperplasia Unknown    Enlarged prostate     Erectile dysfunction Years ago    Hypertension     Irritable bowel syndrome (IBS)     Kidney stone     Sleep apnea        Past Surgical History:   Procedure Laterality Date    EYE SURGERY      TONSILLECTOMY           Current Outpatient Medications:     amLODIPine (NORVASC) 10 MG tablet, Take 1 tablet by mouth Daily., Disp: , Rfl:     aspirin 81 MG chewable tablet, Chew 1 tablet Daily., Disp: 30 tablet, Rfl: 0    atorvastatin (LIPITOR) 80 MG tablet, Take 1 tablet by mouth Every Night., Disp: 30 tablet, Rfl: 0    carbidopa-levodopa (SINEMET)  MG per tablet, Take 2 tablets by mouth Every Evening., Disp: , Rfl:     citalopram (CeleXA) 40 MG tablet, Take 1 tablet by mouth Daily., Disp: , Rfl:     finasteride (PROSCAR) 5 MG tablet, Take 1 tablet by mouth Daily., Disp: 90 tablet, Rfl: 3    fluticasone (FLONASE) 50 MCG/ACT nasal spray, 2 sprays into the nostril(s) as directed by provider Daily., Disp: , Rfl:     losartan (COZAAR) 100 MG tablet, Take 1 tablet by mouth Daily., Disp: , Rfl:     montelukast (SINGULAIR) 10 MG tablet, Take 1 tablet by mouth Daily., Disp: , Rfl:     pantoprazole (PROTONIX) 40 MG EC tablet, Take 1 tablet by mouth Daily., Disp: , Rfl:     tamsulosin (FLOMAX) 0.4 MG capsule 24 hr capsule, Take 2 capsules by mouth Daily., Disp: 180 capsule, Rfl: 4    vitamin D (ERGOCALCIFEROL) 1.25 MG (16171 UT) capsule capsule, , Disp: , Rfl:     Choline Bitartrate (CHOLINE PO), Take 3 tablets by mouth 2 (Two) Times a Day., Disp: , Rfl:     Mirabegron ER (Myrbetriq) 50 MG tablet sustained-release 24 hour 24 hr tablet, Take 50 mg by mouth Daily., Disp: 90 tablet, Rfl: 3    sulfamethoxazole-trimethoprim (Bactrim DS) 800-160 MG per  "tablet, 1 tab bid starting the day before urology test until gone., Disp: 6 tablet, Rfl: 0    No Known Allergies     Family History   Problem Relation Age of Onset    Hypertension Mother     Cancer Father     Hypertension Father         Leia Lee (Mother)    Prostate cancer Father        Social History     Socioeconomic History    Marital status:    Tobacco Use    Smoking status: Former     Passive exposure: Never    Smokeless tobacco: Never    Tobacco comments:     Tried it as a teen. Did not continue.   Vaping Use    Vaping status: Never Used   Substance and Sexual Activity    Alcohol use: Yes     Alcohol/week: 6.0 standard drinks of alcohol     Types: 6 Cans of beer per week    Drug use: Never    Sexual activity: Not Currently     Partners: Female     Birth control/protection: Abstinence, Other, Post-menopausal, Tubal ligation, Birth control pill, Pill       Vital Signs:   /58 (BP Location: Right arm, Patient Position: Sitting, Cuff Size: Large Adult)   Pulse 65   Temp 98 °F (36.7 °C) (Oral)   Ht 165.1 cm (65\")   Wt 92.5 kg (204 lb)   SpO2 96%   BMI 33.95 kg/m²      Physical Exam     Result Review :   The following data was reviewed by: GURMEET Heredia on 4/29/2024:  Results for orders placed or performed in visit on 04/29/24   Bladder Scan   Result Value Ref Range    Urine Volume 18    POC Urinalysis Dipstick, Automated    Specimen: Urine   Result Value Ref Range    Color Dark Yellow Yellow, Straw, Dark Yellow, Cesia    Clarity, UA Clear Clear    Specific Gravity  1.030 1.005 - 1.030    pH, Urine 5.5 5.0 - 8.0    Leukocytes Negative Negative    Nitrite, UA Negative Negative    Protein, POC Negative Negative mg/dL    Glucose, UA Negative Negative mg/dL    Ketones, UA Negative Negative    Urobilinogen, UA 0.2 E.U./dL Normal, 0.2 E.U./dL    Bilirubin Negative Negative    Blood, UA Negative Negative    Lot Number [id number]     Expiration Date 2,282,025         Bladder Scan " interpretation 4/29/2024    Estimation of residual urine via BVI 3000 Verathon Bladder Scan  MA/nurse performing: Dania mendez ma  Residual Urine: 18 ml  Indication: BPH with obstruction/lower urinary tract symptoms    Urinary urgency   Position: Supine  Examination: Incremental scanning of the suprapubic area using 2.0 MHz transducer using copious amounts of acoustic gel.   Findings: An anechoic area was demonstrated which represented the bladder, with measurement of residual urine as noted. I inspected this myself. In that the residual urine was stable or insignificant, refer to plan for treatment and plan necessary at this time.         Procedures        Assessment and Plan    Diagnoses and all orders for this visit:    1. BPH with obstruction/lower urinary tract symptoms (Primary)  -     Bladder Scan  -     POC Urinalysis Dipstick, Automated  -     Cystometrogram; Future  -     sulfamethoxazole-trimethoprim (Bactrim DS) 800-160 MG per tablet; 1 tab bid starting the day before urology test until gone.  Dispense: 6 tablet; Refill: 0    2. Urinary urgency  -     Bladder Scan  -     POC Urinalysis Dipstick, Automated  -     Cystometrogram; Future  -     sulfamethoxazole-trimethoprim (Bactrim DS) 800-160 MG per tablet; 1 tab bid starting the day before urology test until gone.  Dispense: 6 tablet; Refill: 0  -     Mirabegron ER (Myrbetriq) 50 MG tablet sustained-release 24 hour 24 hr tablet; Take 50 mg by mouth Daily.  Dispense: 90 tablet; Refill: 3        We will have him continue tamsulosin and finasteride.     We will increase myrbetriq to 50mg q day.    Will order UDS to evaluate symptoms.      I spent 10 minutes caring for López on this date of service. This time includes time spent by me in the following activities:reviewing tests, obtaining and/or reviewing a separately obtained history, performing a medically appropriate examination and/or evaluation , counseling and educating the patient/family/caregiver,  ordering medications, tests, or procedures, and documenting information in the medical record  Follow Up   Return for uds f/u 2 weeks later .  Patient was given instructions and counseling regarding his condition or for health maintenance advice. Please see specific information pulled into the AVS if appropriate.         This document has been electronically signed by GURMEET Heredia  April 29, 2024 10:18 EDT

## 2024-05-03 ENCOUNTER — TELEPHONE (OUTPATIENT)
Dept: UROLOGY | Facility: CLINIC | Age: 79
End: 2024-05-03
Payer: MEDICARE

## 2024-05-03 NOTE — TELEPHONE ENCOUNTER
Spoke to pt.  I did explain that his medication was denied by insurance.  He does have enough samples to get him through to next appt.  We will address the medication at that appt.  Pt is of understanding.

## 2024-05-03 NOTE — TELEPHONE ENCOUNTER
PT GOT A LETTER FROM [employer] STATING THAT THERE WAS NOT ENOUGH INFO TO SHOW THAT THE MYRBETRIQ WAS MEDICALLY NECESSARY. HIS INSURANCE WILL COVER GEMTESA, OXYBUTYNIN, AND TOLTERODINE. IF YOU HAVE ANY QUESTIONS GIVE HIM A CALL.

## 2024-06-06 ENCOUNTER — PROCEDURE VISIT (OUTPATIENT)
Dept: UROLOGY | Facility: CLINIC | Age: 79
End: 2024-06-06
Payer: MEDICARE

## 2024-06-06 DIAGNOSIS — N40.1 BPH WITH OBSTRUCTION/LOWER URINARY TRACT SYMPTOMS: ICD-10-CM

## 2024-06-06 DIAGNOSIS — N13.8 BPH WITH OBSTRUCTION/LOWER URINARY TRACT SYMPTOMS: ICD-10-CM

## 2024-06-06 DIAGNOSIS — R39.15 URINARY URGENCY: ICD-10-CM

## 2024-06-06 PROCEDURE — 51784 ANAL/URINARY MUSCLE STUDY: CPT | Performed by: NURSE PRACTITIONER

## 2024-06-06 PROCEDURE — 51728 CYSTOMETROGRAM W/VP: CPT | Performed by: NURSE PRACTITIONER

## 2024-06-06 PROCEDURE — 51797 INTRAABDOMINAL PRESSURE TEST: CPT | Performed by: NURSE PRACTITIONER

## 2024-06-20 DIAGNOSIS — N13.8 BPH WITH OBSTRUCTION/LOWER URINARY TRACT SYMPTOMS: ICD-10-CM

## 2024-06-20 DIAGNOSIS — N40.1 BPH WITH OBSTRUCTION/LOWER URINARY TRACT SYMPTOMS: ICD-10-CM

## 2024-06-24 ENCOUNTER — OFFICE VISIT (OUTPATIENT)
Dept: UROLOGY | Facility: CLINIC | Age: 79
End: 2024-06-24
Payer: MEDICARE

## 2024-06-24 VITALS
WEIGHT: 208 LBS | HEART RATE: 67 BPM | HEIGHT: 65 IN | SYSTOLIC BLOOD PRESSURE: 132 MMHG | DIASTOLIC BLOOD PRESSURE: 64 MMHG | OXYGEN SATURATION: 96 % | BODY MASS INDEX: 34.66 KG/M2

## 2024-06-24 DIAGNOSIS — N40.1 BPH WITH OBSTRUCTION/LOWER URINARY TRACT SYMPTOMS: Primary | ICD-10-CM

## 2024-06-24 DIAGNOSIS — R39.15 URINARY URGENCY: ICD-10-CM

## 2024-06-24 DIAGNOSIS — N13.8 BPH WITH OBSTRUCTION/LOWER URINARY TRACT SYMPTOMS: Primary | ICD-10-CM

## 2024-06-24 LAB
BILIRUB BLD-MCNC: NEGATIVE MG/DL
CLARITY, POC: CLEAR
COLOR UR: YELLOW
EXPIRATION DATE: NORMAL
GLUCOSE UR STRIP-MCNC: NEGATIVE MG/DL
KETONES UR QL: NEGATIVE
LEUKOCYTE EST, POC: NEGATIVE
Lab: NORMAL
NITRITE UR-MCNC: NEGATIVE MG/ML
PH UR: 6 [PH] (ref 5–8)
PROT UR STRIP-MCNC: NEGATIVE MG/DL
RBC # UR STRIP: NEGATIVE /UL
SP GR UR: 1.02 (ref 1–1.03)
URINE VOLUME: 0
UROBILINOGEN UR QL: NORMAL

## 2024-06-24 PROCEDURE — 99214 OFFICE O/P EST MOD 30 MIN: CPT | Performed by: NURSE PRACTITIONER

## 2024-06-24 PROCEDURE — 1160F RVW MEDS BY RX/DR IN RCRD: CPT | Performed by: NURSE PRACTITIONER

## 2024-06-24 PROCEDURE — 3078F DIAST BP <80 MM HG: CPT | Performed by: NURSE PRACTITIONER

## 2024-06-24 PROCEDURE — 51798 US URINE CAPACITY MEASURE: CPT | Performed by: NURSE PRACTITIONER

## 2024-06-24 PROCEDURE — 3075F SYST BP GE 130 - 139MM HG: CPT | Performed by: NURSE PRACTITIONER

## 2024-06-24 PROCEDURE — 1159F MED LIST DOCD IN RCRD: CPT | Performed by: NURSE PRACTITIONER

## 2024-06-24 PROCEDURE — 81003 URINALYSIS AUTO W/O SCOPE: CPT | Performed by: NURSE PRACTITIONER

## 2024-06-24 RX ORDER — TAMSULOSIN HYDROCHLORIDE 0.4 MG/1
2 CAPSULE ORAL DAILY
Qty: 180 CAPSULE | Refills: 3 | OUTPATIENT
Start: 2024-06-24

## 2024-06-24 RX ORDER — TAMSULOSIN HYDROCHLORIDE 0.4 MG/1
2 CAPSULE ORAL DAILY
Qty: 180 CAPSULE | Refills: 4 | Status: SHIPPED | OUTPATIENT
Start: 2024-06-24

## 2024-06-24 RX ORDER — MIRABEGRON 50 MG/1
50 TABLET, EXTENDED RELEASE ORAL DAILY
Qty: 90 TABLET | Refills: 3 | Status: SHIPPED | OUTPATIENT
Start: 2024-06-24

## 2024-06-24 RX ORDER — ATORVASTATIN CALCIUM 80 MG/1
80 TABLET, FILM COATED ORAL DAILY
COMMUNITY
End: 2024-06-24

## 2024-06-24 RX ORDER — MIRABEGRON 25 MG/1
25 TABLET, FILM COATED, EXTENDED RELEASE ORAL DAILY
COMMUNITY
Start: 2024-02-19 | End: 2024-06-24

## 2024-06-24 RX ORDER — FINASTERIDE 5 MG/1
5 TABLET, FILM COATED ORAL DAILY
Qty: 90 TABLET | Refills: 3 | Status: SHIPPED | OUTPATIENT
Start: 2024-06-24

## 2024-06-24 NOTE — PROGRESS NOTES
Chief Complaint: Follow-up (Test results)    Subjective         History of Present Illness  López Lee is a 78 y.o. male presents to [facility] to be seen for f/u BPH.    The patient underwent UDS testing on 6/6/2024.  UDS revealed detrusor overactivity without incontinence beginning at 155 mL.  The patient had adequate bladder emptying with low flow rate positive detrusor pressure and with minimal abdominal straining.  Flow charted obstructed at some points of his UDS.    Flow 8 mL/s with 251 mL voided volume. average Flow 5 mL/s.    He is currently on tamsulosin 0.8 mg q day, finasteride 5mg q day.     He is also remaining on mybetriq.     Nocturia x 2-3.     Stream is weak at times.    He states when he is constipated he will have issues with urination and urgency with frequency.     He states when he voids most of the time he will void freely.         Previous:    Last visit started patient on Myrbetriq to see if this would help with urinary urgency as well as nocturia.    He is on tamsulosin 0.8 mg daily as well as finasteride.    He states that he is getting up 2-3 times still.    He has been switched to a bipap this year for sleep apnea.     He was taken off of atenolol and hr is better but still having palpitations.    At last visit we started patient on finasteride in conjunction with has tamsulosin. No side effects.    Stream is still weak at times.     Urgency is improved mostly when he has to have a BM.    Nocturia x 2-3.    His hr is slower. He is seeing cardiology he has a f/u on 2/22.    Last visit we discussed getting patient set up for urodynamics test to evaluate etiology of his lower urinary tract symptoms.    Unfortunately we have not been able to get him scheduled for urodynamics test.    His symptoms remain unchanged.    Last visit we increased the patient's tamsulosin to 0.8 mg daily to see if this would help to alleviate some of his nocturia as well as urgency  frequency and weak urinary stream.    The patient states that the medication is not helping his symptoms at this time.    He states the urgency has somewhat improved.     Stream is still weak and intermittent.    Nocturia still x 2-3.     The patient states that for the last several years he has had nocturia x 2-3     He states urgency with urination during the daytime.     urinary stream is weak at times.     He denies straining to void.     States hesitation with urination.     He states intermittent stream and postvoid dribble.     He has been on tamsulosin 0.4mg q day for at least 6 months but states no significant change in symptoms.    PSA on 4/26/23 was 1.2    He drinks coffee in the AM and sprite in the afternoons no significant caffeine intake.    Father had prostate cancer dx in his early 70s.         Objective     Past Medical History:   Diagnosis Date    Benign prostatic hyperplasia Unknown    Enlarged prostate     Erectile dysfunction Years ago    Hypertension     Irritable bowel syndrome (IBS)     Kidney stone     Sleep apnea        Past Surgical History:   Procedure Laterality Date    EYE SURGERY      TONSILLECTOMY           Current Outpatient Medications:     amLODIPine (NORVASC) 10 MG tablet, Take 1 tablet by mouth Daily., Disp: , Rfl:     aspirin 81 MG chewable tablet, Chew 1 tablet Daily., Disp: 30 tablet, Rfl: 0    atorvastatin (LIPITOR) 80 MG tablet, Take 1 tablet by mouth Every Night., Disp: 30 tablet, Rfl: 0    carbidopa-levodopa (SINEMET)  MG per tablet, Take 2 tablets by mouth Every Evening., Disp: , Rfl:     Choline Bitartrate (CHOLINE PO), Take 3 tablets by mouth 2 (Two) Times a Day., Disp: , Rfl:     citalopram (CeleXA) 40 MG tablet, Take 1 tablet by mouth Daily., Disp: , Rfl:     finasteride (PROSCAR) 5 MG tablet, Take 1 tablet by mouth Daily., Disp: 90 tablet, Rfl: 3    fluticasone (FLONASE) 50 MCG/ACT nasal spray, 2 sprays into the nostril(s) as directed by provider Daily., Disp:  ", Rfl:     losartan (COZAAR) 100 MG tablet, Take 1 tablet by mouth Daily., Disp: , Rfl:     Mirabegron ER (Myrbetriq) 50 MG tablet sustained-release 24 hour 24 hr tablet, Take 50 mg by mouth Daily., Disp: 90 tablet, Rfl: 3    montelukast (SINGULAIR) 10 MG tablet, Take 1 tablet by mouth Daily., Disp: , Rfl:     pantoprazole (PROTONIX) 40 MG EC tablet, Take 1 tablet by mouth Daily., Disp: , Rfl:     tamsulosin (FLOMAX) 0.4 MG capsule 24 hr capsule, Take 2 capsules by mouth Daily., Disp: 180 capsule, Rfl: 4    vitamin D (ERGOCALCIFEROL) 1.25 MG (23664 UT) capsule capsule, , Disp: , Rfl:     No Known Allergies     Family History   Problem Relation Age of Onset    Hypertension Mother     Cancer Father     Hypertension Father         Leia Lee (Mother)    Prostate cancer Father        Social History     Socioeconomic History    Marital status:    Tobacco Use    Smoking status: Former     Passive exposure: Never    Smokeless tobacco: Never    Tobacco comments:     Tried it as a teen. Did not continue.   Vaping Use    Vaping status: Never Used   Substance and Sexual Activity    Alcohol use: Yes     Alcohol/week: 6.0 standard drinks of alcohol     Types: 6 Cans of beer per week    Drug use: Never    Sexual activity: Not Currently     Partners: Female     Birth control/protection: Abstinence, Other, Post-menopausal, Tubal ligation, Birth control pill, Pill       Vital Signs:   /64 (BP Location: Right arm, Patient Position: Sitting, Cuff Size: Large Adult)   Pulse 67   Ht 165.1 cm (65\")   Wt 94.3 kg (208 lb)   SpO2 96%   BMI 34.61 kg/m²      Physical Exam     Result Review :   The following data was reviewed by: GURMEET Heredia on 6/24/2024:  Results for orders placed or performed in visit on 06/24/24   Bladder Scan   Result Value Ref Range    Urine Volume 0    POC Urinalysis Dipstick, Automated    Specimen: Urine   Result Value Ref Range    Color Yellow Yellow, Straw, Dark Yellow, Cesia    " Clarity, UA Clear Clear    Specific Gravity  1.025 1.005 - 1.030    pH, Urine 6.0 5.0 - 8.0    Leukocytes Negative Negative    Nitrite, UA Negative Negative    Protein, POC Negative Negative mg/dL    Glucose, UA Negative Negative mg/dL    Ketones, UA Negative Negative    Urobilinogen, UA 0.2 E.U./dL Normal, 0.2 E.U./dL    Bilirubin Negative Negative    Blood, UA Negative Negative    Lot Number [id number]     Expiration Date 2,025/4         Bladder Scan interpretation 6/24/2024    Estimation of residual urine via BVI 3000 [address] Bladder Scan  MA/nurse performing: Dania mendez ma  Residual Urine: 0 ml  Indication: BPH with obstruction/lower urinary tract symptoms    Urinary urgency   Position: Supine  Examination: Incremental scanning of the suprapubic area using 2.0 MHz transducer using copious amounts of acoustic gel.   Findings: An anechoic area was demonstrated which represented the bladder, with measurement of residual urine as noted. I inspected this myself. In that the residual urine was stable or insignificant, refer to plan for treatment and plan necessary at this time.         Procedures        Assessment and Plan    Diagnoses and all orders for this visit:    1. BPH with obstruction/lower urinary tract symptoms (Primary)  -     POC Urinalysis Dipstick, Automated  -     Bladder Scan  -     tamsulosin (FLOMAX) 0.4 MG capsule 24 hr capsule; Take 2 capsules by mouth Daily.  Dispense: 180 capsule; Refill: 4  -     finasteride (PROSCAR) 5 MG tablet; Take 1 tablet by mouth Daily.  Dispense: 90 tablet; Refill: 3    2. Urinary urgency  -     Mirabegron ER (Myrbetriq) 50 MG tablet sustained-release 24 hour 24 hr tablet; Take 50 mg by mouth Daily.  Dispense: 90 tablet; Refill: 3      Patient is emptying well at this time and is not overly bothered by his symptoms.    At this point in time we will plan for a follow-up appointment in 1 year with a PVR.    He will continue Myrbetriq, tamsulosin and finasteride.        I spent  10 minutes caring for López on this date of service. This time includes time spent by me in the following activities:reviewing tests, obtaining and/or reviewing a separately obtained history, performing a medically appropriate examination and/or evaluation , counseling and educating the patient/family/caregiver, ordering medications, tests, or procedures, and documenting information in the medical record  Follow Up   Return in about 1 year (around 6/24/2025) for Follow-up with PVR.  Patient was given instructions and counseling regarding his condition or for health maintenance advice. Please see specific information pulled into the AVS if appropriate.         This document has been electronically signed by GURMEET Heredia  June 24, 2024 14:10 EDT

## 2024-09-27 ENCOUNTER — TELEPHONE (OUTPATIENT)
Dept: UROLOGY | Facility: CLINIC | Age: 79
End: 2024-09-27
Payer: MEDICARE

## 2024-09-27 DIAGNOSIS — N40.1 BPH WITH OBSTRUCTION/LOWER URINARY TRACT SYMPTOMS: ICD-10-CM

## 2024-09-27 DIAGNOSIS — N13.8 BPH WITH OBSTRUCTION/LOWER URINARY TRACT SYMPTOMS: ICD-10-CM

## 2024-09-27 RX ORDER — TAMSULOSIN HYDROCHLORIDE 0.4 MG/1
2 CAPSULE ORAL DAILY
Qty: 180 CAPSULE | Refills: 4 | Status: SHIPPED | OUTPATIENT
Start: 2024-09-27

## 2025-04-29 NOTE — PROGRESS NOTES
Chief Complaint        NEW PATIENT (Had GI bleed in November and has had iron/blood infusions. Suspected cirrhosis. Otherwise no issues or concerns. )    Patient or patient representative verbalized consent for the use of Ambient Listening during the visit with  GURMEET Dinh for chart documentation. 5/2/2025  10:10 EDT    History of Present Illness      López Lee is a 79 y.o. male who presents to [facility] as a new patient     History of Present Illness  The patient is a 79-year-old male presenting to the office today for evaluation of suspected cirrhosis, review I believe an prior iron transfusion and blood transfusion related to GI bleed in November.    A CT scan indicated cirrhosis of the liver. He has a history of alcohol consumption, having consumed approximately 2 beers daily for the past 5 to 6 years, with his last intake in 11/2024. No family history of liver disease, abdominal or leg swelling, exposure to hepatitis, intravenous drug abuse, tattoos, or excessive Tylenol use is reported. Tylenol was previously used for headaches but has not been required recently. Weight loss of approximately 10 to 15 pounds is noted, reducing from 208 to 191 pounds. Coffee is consumed with breakfast, and artificial sweeteners are used due to sugar intolerance. Sugar cravings persist, and cereal is consumed in the morning. An upper endoscopy was performed 5 to 6 years ago.  Patient wishes to have a EGD performed in [place] by previous general surgeon.    The hematologist suggested the possibility of celiac disease after a blood test. No specific lab tests for celiac disease have been conducted, and adherence to a gluten-free diet is challenging.    Dr. Ansley Willingham is managing prostate issues, and Myfembree is prescribed to lower testosterone levels and maintain estrogen levels. Side effects such as enlarged breasts and tenderness are experienced. The last consultation with  provider was in the previous summer, and a follow-up is planned for 06/2025.    Low platelet counts have been present since a gastrointestinal (GI) bleed in 11/2024. Hematology care is ongoing, with an appointment scheduled later this month or in 06/2025. Two iron transfusions were received, with the last one administered in 12/2024. An iron supplement pill was taken but discontinued after normal lab results were reported by the hematologist. A blood transfusion was received during the GI bleed.    SOCIAL HISTORY  Occupation:  for 42 years  Diet: Eats cereal in the morning, has artificial sweeteners, avoids sugar, drinks coffee with breakfast  Alcohol: Drinks beer occasionally, about 2 beers a day, and has been doing so for about 5-6 years. Last drank in November.  Coffee/Tea/Caffeine-containing Drinks: Drinks coffee with breakfast    FAMILY HISTORY  - Negative for liver disease    Most recent labs- 4/1/25    Colonoscopy: Review of the patient's most recent colonoscopy performed by Dr. Fernando Dominguez on 11/13/2024    CT Abdomen/Pelvis- 11/11/24  1. Cirrhosis and splenomegaly. There is no ascites.   2. Moderate pandiverticulosis without evidence of diverticulitis.   3. Prostate enlargement indenting the bladder base.   4. Patent mesenteric and renal vasculature. There is no evidence of   active GI bleeding.     Colonoscopy: Review of the patient's most recent colonoscopy performed by Dr. Fernando Dominguez 2/13/23      Results       Result Review :   The following data was reviewed by: GURMEET Dinh on 05/02/2025           Liver Workup   Ferritin   Date Value Ref Range Status   04/01/2025 23 (L) 26.00 - 388.00 ng/mL Final     Protime   Date Value Ref Range Status   11/21/2024 15.7 (H) 12.7 - 14.6 seconds Final     INR   Date Value Ref Range Status   11/21/2024 1.2 0.90 - 1.20 Final     Comment:     Recommended therapeutic ranges using International Normalized Ratio (INR) are:    INR RANGE   2.0 - 3.0        Routine oral anticoagulant therapy    2.5 - 3.5       Oral anticoagulant therapy for patients with thromboembolic events on standard doses of Coumadin and those with mechanical heart valves.             Results  Labs   - Glucose: 170 mg/dL   - Kidney function: Normal   - Electrolytes: Normal   - Liver enzymes: Normal   - Platelets: 04/01/2025, Low   - Hemoglobin: 04/01/2025, 13 g/dL   - White blood cell count: 04/01/2025, Slightly low   - Iron levels: Normal    Imaging   - CT scan of the liver: Nodularity indicating cirrhosis. No ascites. Spleen slightly enlarged. Diverticulosis present. Prostate slightly enlarged. Vascular system patent with no GI bleed.        Past Medical History       Past Medical History:   Diagnosis Date    Anemia 11/24    Benign prostatic hyperplasia Unknown    Cholelithiasis 2002    Only once.    Colon polyp 11/22    Enlarged prostate     Erectile dysfunction Years ago    Fatty liver     GERD (gastroesophageal reflux disease)     GI (gastrointestinal bleed) 11/24    In Colon    Hernia 2023    Hyperlipidemia     Dont renmember    Hypertension     Been there for a while.  Now under control    Irritable bowel syndrome (IBS)     Kidney stone     Sleep apnea        Past Surgical History:   Procedure Laterality Date    COLONOSCOPY  11/24    EYE SURGERY      TONSILLECTOMY      UPPER GASTROINTESTINAL ENDOSCOPY  2022         Current Outpatient Medications:     b complex vitamins capsule, Take 1 capsule by mouth Daily., Disp: , Rfl:     carbidopa-levodopa (SINEMET)  MG per tablet, Take 2 tablets by mouth Every Evening., Disp: , Rfl:     citalopram (CeleXA) 40 MG tablet, Take 1 tablet by mouth Daily., Disp: , Rfl:     finasteride (PROSCAR) 5 MG tablet, Take 1 tablet by mouth Daily., Disp: 90 tablet, Rfl: 3    losartan (COZAAR) 100 MG tablet, Take 1 tablet by mouth Daily., Disp: , Rfl:     Mirabegron ER (Myrbetriq) 50 MG tablet sustained-release 24 hour 24 hr tablet, Take 50 mg by mouth Daily.,  "Disp: 90 tablet, Rfl: 3    montelukast (SINGULAIR) 10 MG tablet, Take 1 tablet by mouth Daily., Disp: , Rfl:     pantoprazole (PROTONIX) 40 MG EC tablet, Take 1 tablet by mouth Daily., Disp: , Rfl:     tamsulosin (FLOMAX) 0.4 MG capsule 24 hr capsule, Take 2 capsules by mouth Daily., Disp: 180 capsule, Rfl: 4    vitamin D (ERGOCALCIFEROL) 1.25 MG (65211 UT) capsule capsule, , Disp: , Rfl:      No Known Allergies    Family History   Problem Relation Age of Onset    Hypertension Mother     Pancreatitis Mother     Cancer Father         Prostate Cancer    Hypertension Father         Leia Lee (Mother)    Prostate cancer Father     Other (prostate cancer) Father     Colon cancer Neg Hx     Esophageal cancer Neg Hx     Rectal cancer Neg Hx     Stomach cancer Neg Hx     Liver cancer Neg Hx         Social History     Social History Narrative    Not on file       Objective       Objective     Vital Signs:   /72 (BP Location: Right arm, Patient Position: Sitting, Cuff Size: Adult)   Pulse 62   Ht 165.1 cm (65\")   Wt 89 kg (196 lb 1.6 oz)   SpO2 97%   BMI 32.63 kg/m²     Body mass index is 32.63 kg/m².    Physical Exam    Physical Exam  Abdomen: No ascites noted. Spleen slightly enlarged.               Assessment & Plan          Assessment and Plan    Diagnoses and all orders for this visit:    1. Fatty liver disease, nonalcoholic (Primary)  -     US Abdomen Limited; Future  -     CBC (No Diff); Future  -     Comprehensive Metabolic Panel; Future  -     Protime-INR; Future  -     AFP Tumor Marker; Future  -     POLLACK Fibrosure; Future    2. Gastroesophageal reflux disease, unspecified whether esophagitis present  -     US Abdomen Limited; Future  -     CBC (No Diff); Future  -     Comprehensive Metabolic Panel; Future  -     Protime-INR; Future  -     AFP Tumor Marker; Future  -     POLLACK Fibrosure; Future    3. Cirrhosis of liver without ascites, unspecified hepatic cirrhosis type  -     US Abdomen Limited; " Future  -     CBC (No Diff); Future  -     Comprehensive Metabolic Panel; Future  -     Protime-INR; Future  -     AFP Tumor Marker; Future  -     POLLACK Fibrosure; Future    4. POLLACK (nonalcoholic steatohepatitis)  -     US Abdomen Limited; Future  -     CBC (No Diff); Future  -     Comprehensive Metabolic Panel; Future  -     Protime-INR; Future  -     AFP Tumor Marker; Future  -     POLLACK Fibrosure; Future    5. Encounter for screening for cardiovascular disorders  -     POLLACK Fibrosure; Future    6. Class 1 obesity with body mass index (BMI) of 32.0 to 32.9 in adult, unspecified obesity type, unspecified whether serious comorbidity present          Assessment & Plan  1. Cirrhosis:  - CT scan results indicate cirrhosis of the liver with nodularity.  - Low platelet count noted, often associated with cirrhosis.  - Absence of ascites and slight splenomegaly observed.  - Commendable weight loss of approximately 10 to 15 pounds; further weight reduction of about 20 pounds recommended.  - Maintain a healthy lifestyle and continue weight loss efforts.  - Mediterranean diet recommended, including lean meats (fish, chicken, turkey), fruits, and vegetables.  - Suggest consumption of 1 to 2 cups of black coffee daily.  - Laboratory tests ordered to assess liver  - Ultrasound of the liver ordered.  - Inform if proceeding with an upper endoscopy for scheduling.  Patient wished to defer due to wanting to have this performed in [place] with general surgery.    2. Celiac disease:  - No laboratory tests for celiac disease conducted yet.  - Advised to avoid gluten-containing foods such as bread and pasta.  - Opt for gluten-free cereals like Cheerios.  - Upper endoscopy recommended to screen for celiac disease by taking biopsies of the small intestines.  - Inform if proceeding with an upper endoscopy for scheduling.      3. GI bleed:  - GI bleed occurred in 11/2024; received two iron transfusions, last one in 12/2024.  - Iron levels  currently good.  - Previously taking an iron supplement pill; stopped as levels improved.    Follow-up: 11/2025.          Follow Up       Follow Up   Return in about 6 months (around 11/2/2025).  Patient was given instructions and counseling regarding his condition or for health maintenance advice. Please see specific information pulled into the AVS if appropriate.

## 2025-05-02 ENCOUNTER — OFFICE VISIT (OUTPATIENT)
Dept: GASTROENTEROLOGY | Facility: CLINIC | Age: 80
End: 2025-05-02
Payer: MEDICARE

## 2025-05-02 VITALS
SYSTOLIC BLOOD PRESSURE: 146 MMHG | OXYGEN SATURATION: 97 % | HEIGHT: 65 IN | BODY MASS INDEX: 32.67 KG/M2 | WEIGHT: 196.1 LBS | HEART RATE: 62 BPM | DIASTOLIC BLOOD PRESSURE: 72 MMHG

## 2025-05-02 DIAGNOSIS — Z13.6 ENCOUNTER FOR SCREENING FOR CARDIOVASCULAR DISORDERS: ICD-10-CM

## 2025-05-02 DIAGNOSIS — E66.811 CLASS 1 OBESITY WITH BODY MASS INDEX (BMI) OF 32.0 TO 32.9 IN ADULT, UNSPECIFIED OBESITY TYPE, UNSPECIFIED WHETHER SERIOUS COMORBIDITY PRESENT: ICD-10-CM

## 2025-05-02 DIAGNOSIS — K21.9 GASTROESOPHAGEAL REFLUX DISEASE, UNSPECIFIED WHETHER ESOPHAGITIS PRESENT: ICD-10-CM

## 2025-05-02 DIAGNOSIS — K76.0 FATTY LIVER DISEASE, NONALCOHOLIC: Primary | ICD-10-CM

## 2025-05-02 DIAGNOSIS — K74.60 CIRRHOSIS OF LIVER WITHOUT ASCITES, UNSPECIFIED HEPATIC CIRRHOSIS TYPE: ICD-10-CM

## 2025-05-02 DIAGNOSIS — K75.81 NASH (NONALCOHOLIC STEATOHEPATITIS): ICD-10-CM

## 2025-05-02 RX ORDER — VITAMIN B COMPLEX
1 CAPSULE ORAL DAILY
COMMUNITY

## 2025-05-05 ENCOUNTER — LAB (OUTPATIENT)
Dept: LAB | Facility: HOSPITAL | Age: 80
End: 2025-05-05
Payer: MEDICARE

## 2025-05-05 DIAGNOSIS — K76.0 FATTY LIVER DISEASE, NONALCOHOLIC: ICD-10-CM

## 2025-05-05 DIAGNOSIS — K75.81 NASH (NONALCOHOLIC STEATOHEPATITIS): ICD-10-CM

## 2025-05-05 DIAGNOSIS — Z13.6 ENCOUNTER FOR SCREENING FOR CARDIOVASCULAR DISORDERS: ICD-10-CM

## 2025-05-05 DIAGNOSIS — K21.9 GASTROESOPHAGEAL REFLUX DISEASE, UNSPECIFIED WHETHER ESOPHAGITIS PRESENT: ICD-10-CM

## 2025-05-05 DIAGNOSIS — K74.60 CIRRHOSIS OF LIVER WITHOUT ASCITES, UNSPECIFIED HEPATIC CIRRHOSIS TYPE: ICD-10-CM

## 2025-05-05 DIAGNOSIS — K76.0 FATTY LIVER DISEASE, NONALCOHOLIC: Primary | ICD-10-CM

## 2025-05-05 LAB
ALBUMIN SERPL-MCNC: 4 G/DL (ref 3.5–5.2)
ALBUMIN/GLOB SERPL: 1.1 G/DL
ALP SERPL-CCNC: 87 U/L (ref 39–117)
ALPHA-FETOPROTEIN: 5.26 NG/ML (ref 0–8.3)
ALT SERPL W P-5'-P-CCNC: 21 U/L (ref 1–41)
ANION GAP SERPL CALCULATED.3IONS-SCNC: 9.7 MMOL/L (ref 5–15)
AST SERPL-CCNC: 42 U/L (ref 1–40)
BILIRUB SERPL-MCNC: 1 MG/DL (ref 0–1.2)
BUN SERPL-MCNC: 11 MG/DL (ref 8–23)
BUN/CREAT SERPL: 12.4 (ref 7–25)
CALCIUM SPEC-SCNC: 9.6 MG/DL (ref 8.6–10.5)
CHLORIDE SERPL-SCNC: 100 MMOL/L (ref 98–107)
CO2 SERPL-SCNC: 27.3 MMOL/L (ref 22–29)
CREAT SERPL-MCNC: 0.89 MG/DL (ref 0.76–1.27)
DEPRECATED RDW RBC AUTO: 50.2 FL (ref 37–54)
EGFRCR SERPLBLD CKD-EPI 2021: 87.7 ML/MIN/1.73
ERYTHROCYTE [DISTWIDTH] IN BLOOD BY AUTOMATED COUNT: 14.8 % (ref 12.3–15.4)
GLOBULIN UR ELPH-MCNC: 3.5 GM/DL
GLUCOSE SERPL-MCNC: 121 MG/DL (ref 65–99)
HCT VFR BLD AUTO: 40 % (ref 37.5–51)
HGB BLD-MCNC: 13.1 G/DL (ref 13–17.7)
INR PPP: 1.26 (ref 0.86–1.15)
MCH RBC QN AUTO: 30.1 PG (ref 26.6–33)
MCHC RBC AUTO-ENTMCNC: 32.8 G/DL (ref 31.5–35.7)
MCV RBC AUTO: 92 FL (ref 79–97)
PLATELET # BLD AUTO: 61 10*3/MM3 (ref 140–450)
PMV BLD AUTO: 10.6 FL (ref 6–12)
POTASSIUM SERPL-SCNC: 4.5 MMOL/L (ref 3.5–5.2)
PROT SERPL-MCNC: 7.5 G/DL (ref 6–8.5)
PROTHROMBIN TIME: 16.3 SECONDS (ref 11.8–14.9)
RBC # BLD AUTO: 4.35 10*6/MM3 (ref 4.14–5.8)
SODIUM SERPL-SCNC: 137 MMOL/L (ref 136–145)
WBC NRBC COR # BLD AUTO: 2.84 10*3/MM3 (ref 3.4–10.8)

## 2025-05-05 PROCEDURE — 85027 COMPLETE CBC AUTOMATED: CPT

## 2025-05-05 PROCEDURE — 36415 COLL VENOUS BLD VENIPUNCTURE: CPT

## 2025-05-05 PROCEDURE — 82105 ALPHA-FETOPROTEIN SERUM: CPT

## 2025-05-05 PROCEDURE — 80053 COMPREHEN METABOLIC PANEL: CPT

## 2025-05-05 PROCEDURE — 83883 ASSAY NEPHELOMETRY NOT SPEC: CPT

## 2025-05-05 PROCEDURE — 85610 PROTHROMBIN TIME: CPT

## 2025-05-05 PROCEDURE — 82977 ASSAY OF GGT: CPT

## 2025-05-05 PROCEDURE — 82465 ASSAY BLD/SERUM CHOLESTEROL: CPT

## 2025-05-05 PROCEDURE — 84478 ASSAY OF TRIGLYCERIDES: CPT

## 2025-05-05 PROCEDURE — 82172 ASSAY OF APOLIPOPROTEIN: CPT

## 2025-05-05 PROCEDURE — 83010 ASSAY OF HAPTOGLOBIN QUANT: CPT

## 2025-05-09 LAB
A2 MACROGLOB SERPL-MCNC: 417 MG/DL (ref 110–276)
ALT SERPL W P-5'-P-CCNC: 36 IU/L (ref 0–55)
APO A-I SERPL-MCNC: 126 MG/DL (ref 101–178)
AST SERPL W P-5'-P-CCNC: 45 IU/L (ref 0–40)
BILIRUB SERPL-MCNC: 0.8 MG/DL (ref 0–1.2)
CHOLEST SERPL-MCNC: 153 MG/DL (ref 100–199)
FIBROSIS SCORING:: ABNORMAL
FIBROSIS STAGE SERPL QL: ABNORMAL
GGT SERPL-CCNC: 104 IU/L (ref 0–65)
GLUCOSE SERPL-MCNC: 117 MG/DL (ref 70–99)
HAPTOGLOB SERPL-MCNC: 46 MG/DL (ref 34–355)
LABORATORY COMMENT REPORT: ABNORMAL
LIVER FIBR SCORE SERPL CALC.FIBROSURE: 0.94 (ref 0–0.21)
LIVER STEATOSIS GRADE SERPL QL: ABNORMAL
LIVER STEATOSIS SCORE SERPL: 0.58 (ref 0–0.4)
NASH GRADE SERPL QL: ABNORMAL
NASH INTERPRETATION SERPL-IMP: ABNORMAL
NASH SCORE SERPL: 0.9 (ref 0–0.25)
NASH SCORING: ABNORMAL
STEATOSIS SCORING: ABNORMAL
TEST PERFORMANCE INFO SPEC: ABNORMAL
TEST PERFORMANCE INFO SPEC: ABNORMAL
TRIGL SERPL-MCNC: 121 MG/DL (ref 0–149)

## 2025-06-03 ENCOUNTER — LAB (OUTPATIENT)
Dept: LAB | Facility: HOSPITAL | Age: 80
End: 2025-06-03
Payer: MEDICARE

## 2025-06-03 ENCOUNTER — HOSPITAL ENCOUNTER (OUTPATIENT)
Dept: ULTRASOUND IMAGING | Facility: HOSPITAL | Age: 80
Discharge: HOME | End: 2025-06-03
Payer: MEDICARE

## 2025-06-03 DIAGNOSIS — K76.0 FATTY LIVER DISEASE, NONALCOHOLIC: ICD-10-CM

## 2025-06-03 DIAGNOSIS — K75.81 NASH (NONALCOHOLIC STEATOHEPATITIS): ICD-10-CM

## 2025-06-03 DIAGNOSIS — K21.9 GASTROESOPHAGEAL REFLUX DISEASE, UNSPECIFIED WHETHER ESOPHAGITIS PRESENT: ICD-10-CM

## 2025-06-03 DIAGNOSIS — K74.60 CIRRHOSIS OF LIVER WITHOUT ASCITES, UNSPECIFIED HEPATIC CIRRHOSIS TYPE: ICD-10-CM

## 2025-06-03 LAB
BASOPHILS # BLD AUTO: 0.03 10*3/MM3 (ref 0–0.2)
BASOPHILS NFR BLD AUTO: 0.9 % (ref 0–1.5)
DEPRECATED RDW RBC AUTO: 51.9 FL (ref 37–54)
EOSINOPHIL # BLD AUTO: 0.15 10*3/MM3 (ref 0–0.4)
EOSINOPHIL NFR BLD AUTO: 4.7 % (ref 0.3–6.2)
ERYTHROCYTE [DISTWIDTH] IN BLOOD BY AUTOMATED COUNT: 15 % (ref 12.3–15.4)
HCT VFR BLD AUTO: 39.6 % (ref 37.5–51)
HGB BLD-MCNC: 13 G/DL (ref 13–17.7)
IMM GRANULOCYTES # BLD AUTO: 0.01 10*3/MM3 (ref 0–0.05)
IMM GRANULOCYTES NFR BLD AUTO: 0.3 % (ref 0–0.5)
LYMPHOCYTES # BLD AUTO: 0.7 10*3/MM3 (ref 0.7–3.1)
LYMPHOCYTES NFR BLD AUTO: 22 % (ref 19.6–45.3)
MCH RBC QN AUTO: 30.4 PG (ref 26.6–33)
MCHC RBC AUTO-ENTMCNC: 32.8 G/DL (ref 31.5–35.7)
MCV RBC AUTO: 92.5 FL (ref 79–97)
MONOCYTES # BLD AUTO: 0.44 10*3/MM3 (ref 0.1–0.9)
MONOCYTES NFR BLD AUTO: 13.8 % (ref 5–12)
NEUTROPHILS NFR BLD AUTO: 1.85 10*3/MM3 (ref 1.7–7)
NEUTROPHILS NFR BLD AUTO: 58.3 % (ref 42.7–76)
PLATELET # BLD AUTO: 72 10*3/MM3 (ref 140–450)
PMV BLD AUTO: 11.3 FL (ref 6–12)
RBC # BLD AUTO: 4.28 10*6/MM3 (ref 4.14–5.8)
WBC NRBC COR # BLD AUTO: 3.18 10*3/MM3 (ref 3.4–10.8)

## 2025-06-03 PROCEDURE — 76705 ECHO EXAM OF ABDOMEN: CPT

## 2025-06-03 PROCEDURE — 85025 COMPLETE CBC W/AUTO DIFF WBC: CPT

## 2025-06-03 PROCEDURE — 36415 COLL VENOUS BLD VENIPUNCTURE: CPT

## 2025-06-23 ENCOUNTER — OFFICE VISIT (OUTPATIENT)
Dept: UROLOGY | Facility: CLINIC | Age: 80
End: 2025-06-23
Payer: MEDICARE

## 2025-06-23 VITALS — HEIGHT: 65 IN | BODY MASS INDEX: 31.59 KG/M2 | WEIGHT: 189.6 LBS | RESPIRATION RATE: 16 BRPM

## 2025-06-23 DIAGNOSIS — N40.1 BPH WITH OBSTRUCTION/LOWER URINARY TRACT SYMPTOMS: Primary | ICD-10-CM

## 2025-06-23 DIAGNOSIS — N13.8 BPH WITH OBSTRUCTION/LOWER URINARY TRACT SYMPTOMS: Primary | ICD-10-CM

## 2025-06-23 PROBLEM — G25.81 RLS (RESTLESS LEGS SYNDROME): Status: ACTIVE | Noted: 2025-06-23

## 2025-06-23 PROBLEM — D50.0 IRON DEFICIENCY ANEMIA DUE TO CHRONIC BLOOD LOSS: Status: ACTIVE | Noted: 2024-12-30

## 2025-06-23 PROBLEM — M19.90 ARTHRITIS: Status: ACTIVE | Noted: 2024-12-06

## 2025-06-23 PROBLEM — K90.9 MALABSORPTION OF IRON: Status: ACTIVE | Noted: 2024-12-30

## 2025-06-23 PROBLEM — R01.1 MURMUR: Status: ACTIVE | Noted: 2024-12-06

## 2025-06-23 PROBLEM — K92.2 LOWER GI BLEED: Status: ACTIVE | Noted: 2024-11-11

## 2025-06-23 PROBLEM — J44.9 COPD (CHRONIC OBSTRUCTIVE PULMONARY DISEASE): Chronic | Status: ACTIVE | Noted: 2025-06-23

## 2025-06-23 LAB
BILIRUB BLD-MCNC: NEGATIVE MG/DL
CLARITY, POC: CLEAR
COLOR UR: YELLOW
EXPIRATION DATE: ABNORMAL
GLUCOSE UR STRIP-MCNC: NEGATIVE MG/DL
KETONES UR QL: ABNORMAL
LEUKOCYTE EST, POC: NEGATIVE
Lab: ABNORMAL
NITRITE UR-MCNC: NEGATIVE MG/ML
PH UR: 7 [PH] (ref 5–8)
PROT UR STRIP-MCNC: NEGATIVE MG/DL
RBC # UR STRIP: NEGATIVE /UL
SP GR UR: 1.02 (ref 1–1.03)
URINE VOLUME: 0
UROBILINOGEN UR QL: NORMAL

## 2025-06-23 PROCEDURE — 1159F MED LIST DOCD IN RCRD: CPT | Performed by: NURSE PRACTITIONER

## 2025-06-23 PROCEDURE — G2211 COMPLEX E/M VISIT ADD ON: HCPCS | Performed by: NURSE PRACTITIONER

## 2025-06-23 PROCEDURE — 99214 OFFICE O/P EST MOD 30 MIN: CPT | Performed by: NURSE PRACTITIONER

## 2025-06-23 PROCEDURE — 1160F RVW MEDS BY RX/DR IN RCRD: CPT | Performed by: NURSE PRACTITIONER

## 2025-06-23 PROCEDURE — 51798 US URINE CAPACITY MEASURE: CPT | Performed by: NURSE PRACTITIONER

## 2025-06-23 PROCEDURE — 81003 URINALYSIS AUTO W/O SCOPE: CPT | Performed by: NURSE PRACTITIONER

## 2025-06-23 NOTE — PROGRESS NOTES
Chief Complaint: Benign Prostatic Hypertrophy (1 year f/u)    Subjective         Benign Prostatic Hypertrophy      López Lee is a 79 y.o. male presents to [facility] to be seen for f/u BPH.    At last visit we continued patient on tamsulosin, Myrbetriq and finasteride.    He states he has been having nipple tenderness since about 6-8 months ago.     Nocturia x 2-3    Stream is decent.     Urgency he is still with urgency if he delays the urge to void. No issues with frequency.    Denies Straining/hesitancy.      PREVIOUS:    The patient underwent UDS testing on 6/6/2024.  UDS revealed detrusor overactivity without incontinence beginning at 155 mL.  The patient had adequate bladder emptying with low flow rate positive detrusor pressure and with minimal abdominal straining.  Flow charted obstructed at some points of his UDS.    Flow 8 mL/s with 251 mL voided volume. average Flow 5 mL/s.    He is currently on tamsulosin 0.8 mg q day, finasteride 5mg q day.     He is also remaining on mybetriq.     Nocturia x 2-3.     Stream is weak at times.    He states when he is constipated he will have issues with urination and urgency with frequency.     He states when he voids most of the time he will void freely.         Previous:    Last visit started patient on Myrbetriq to see if this would help with urinary urgency as well as nocturia.    He is on tamsulosin 0.8 mg daily as well as finasteride.    He states that he is getting up 2-3 times still.    He has been switched to a bipap this year for sleep apnea.     He was taken off of atenolol and hr is better but still having palpitations.    At last visit we started patient on finasteride in conjunction with has tamsulosin. No side effects.    Stream is still weak at times.     Urgency is improved mostly when he has to have a BM.    Nocturia x 2-3.    His hr is slower. He is seeing cardiology he has a f/u on 2/22.    Last visit we discussed  getting patient set up for urodynamics test to evaluate etiology of his lower urinary tract symptoms.    Unfortunately we have not been able to get him scheduled for urodynamics test.    His symptoms remain unchanged.    Last visit we increased the patient's tamsulosin to 0.8 mg daily to see if this would help to alleviate some of his nocturia as well as urgency frequency and weak urinary stream.    The patient states that the medication is not helping his symptoms at this time.    He states the urgency has somewhat improved.     Stream is still weak and intermittent.    Nocturia still x 2-3.     The patient states that for the last several years he has had nocturia x 2-3     He states urgency with urination during the daytime.     urinary stream is weak at times.     He denies straining to void.     States hesitation with urination.     He states intermittent stream and postvoid dribble.     He has been on tamsulosin 0.4mg q day for at least 6 months but states no significant change in symptoms.    PSA on 4/26/23 was 1.2    He drinks coffee in the AM and sprite in the afternoons no significant caffeine intake.    Father had prostate cancer dx in his early 70s.         Objective     Past Medical History:   Diagnosis Date    Anemia 11/24    Benign prostatic hyperplasia Unknown    Cholelithiasis 2002    Only once.    Colon polyp 11/22    Enlarged prostate     Erectile dysfunction Years ago    Fatty liver     GERD (gastroesophageal reflux disease)     GI (gastrointestinal bleed) 11/24    In Colon    Hernia 2023    Hyperlipidemia     Dont renmember    Hypertension     Been there for a while.  Now under control    Irritable bowel syndrome (IBS)     Kidney stone     Sleep apnea        Past Surgical History:   Procedure Laterality Date    COLONOSCOPY  11/24    EYE SURGERY      TONSILLECTOMY      UPPER GASTROINTESTINAL ENDOSCOPY  2022         Current Outpatient Medications:     b complex vitamins capsule, Take 1 capsule by  "mouth Daily., Disp: , Rfl:     carbidopa-levodopa (SINEMET)  MG per tablet, Take 2 tablets by mouth Every Evening., Disp: , Rfl:     citalopram (CeleXA) 40 MG tablet, Take 1 tablet by mouth Daily., Disp: , Rfl:     losartan (COZAAR) 100 MG tablet, Take 1 tablet by mouth Daily., Disp: , Rfl:     Mirabegron ER (Myrbetriq) 50 MG tablet sustained-release 24 hour 24 hr tablet, Take 50 mg by mouth Daily., Disp: 90 tablet, Rfl: 3    montelukast (SINGULAIR) 10 MG tablet, Take 1 tablet by mouth Daily., Disp: , Rfl:     pantoprazole (PROTONIX) 40 MG EC tablet, Take 1 tablet by mouth Daily., Disp: , Rfl:     tamsulosin (FLOMAX) 0.4 MG capsule 24 hr capsule, Take 2 capsules by mouth Daily., Disp: 180 capsule, Rfl: 4    vitamin D (ERGOCALCIFEROL) 1.25 MG (63381 UT) capsule capsule, , Disp: , Rfl:     No Known Allergies     Family History   Problem Relation Age of Onset    Hypertension Mother     Pancreatitis Mother     Cancer Father         Prostate Cancer    Hypertension Father         Leia Lee (Mother)    Prostate cancer Father     Other (prostate cancer) Father     Colon cancer Neg Hx     Esophageal cancer Neg Hx     Rectal cancer Neg Hx     Stomach cancer Neg Hx     Liver cancer Neg Hx        Social History     Socioeconomic History    Marital status:    Tobacco Use    Smoking status: Former     Passive exposure: Never    Smokeless tobacco: Never    Tobacco comments:     Tried it as a teen. Did not continue.   Vaping Use    Vaping status: Never Used   Substance and Sexual Activity    Alcohol use: Not Currently     Alcohol/week: 6.0 standard drinks of alcohol     Types: 6 Cans of beer per week     Comment: about 2 beers a day    Drug use: Never    Sexual activity: Not Currently     Partners: Female     Birth control/protection: Tubal ligation, Birth control pill       Vital Signs:   Resp 16   Ht 165.1 cm (65\")   Wt 86 kg (189 lb 9.5 oz)   BMI 31.55 kg/m²      Physical Exam     Result Review :   The " following data was reviewed by: GURMEET Heredia on 6/23/2025:  Results for orders placed or performed in visit on 06/23/25   Bladder Scan    Collection Time: 06/23/25  1:08 PM   Result Value Ref Range    Urine Volume 0    POC Urinalysis Dipstick, Automated    Collection Time: 06/23/25  1:24 PM    Specimen: Urine   Result Value Ref Range    Color Yellow Yellow, Straw, Dark Yellow, Cesia    Clarity, UA Clear Clear    Specific Gravity  1.020 1.005 - 1.030    pH, Urine 7.0 5.0 - 8.0    Leukocytes Negative Negative    Nitrite, UA Negative Negative    Protein, POC Negative Negative mg/dL    Glucose, UA Negative Negative mg/dL    Ketones, UA Trace (A) Negative    Urobilinogen, UA Normal Normal, 0.2 E.U./dL    Bilirubin Negative Negative    Blood, UA Negative Negative    Lot Number [id number]     Expiration Date 4/2,026         Bladder Scan interpretation 6/23/2025    Estimation of residual urine via BVI 3000 [address] Bladder Scan  MA/nurse performing: clarence whitfield ma   Residual Urine: 0 ml  Indication: BPH with obstruction/lower urinary tract symptoms   Position: Supine  Examination: Incremental scanning of the suprapubic area using 2.0 MHz transducer using copious amounts of acoustic gel.   Findings: An anechoic area was demonstrated which represented the bladder, with measurement of residual urine as noted. I inspected this myself. In that the residual urine was stable or insignificant, refer to plan for treatment and plan necessary at this time.         Procedures        Assessment and Plan    Diagnoses and all orders for this visit:    1. BPH with obstruction/lower urinary tract symptoms (Primary)  -     POC Urinalysis Dipstick, Automated  -     Bladder Scan      Given side effects from his finasteride discussed surgical procedures for getting his LUTS symptoms managed.    We also discussed a trial of coming off of finasteride.     He does not wish to proceed with surgical intervention at this time due to possible  risk of bleeding with surgical procedures. His plts are low and he would not make a good surgical candidate until his coagulopathy has been mitigated.     He will stop finasteride and let us know how symptoms both urologically and the side effect of nipple pain does.     May consider u/s of breast if nipple pain persists.       I spent 10 minutes caring for López on this date of service. This time includes time spent by me in the following activities:reviewing tests, obtaining and/or reviewing a separately obtained history, performing a medically appropriate examination and/or evaluation , counseling and educating the patient/family/caregiver, ordering medications, tests, or procedures, and documenting information in the medical record  Follow Up   Return in about 3 months (around 9/23/2025) for f/u bph / nipple pain.  Patient was given instructions and counseling regarding his condition or for health maintenance advice. Please see specific information pulled into the AVS if appropriate.         This document has been electronically signed by GURMEET Heredia  June 23, 2025 13:41 EDT

## 2025-08-25 DIAGNOSIS — R39.15 URINARY URGENCY: ICD-10-CM

## 2025-08-25 RX ORDER — MIRABEGRON 50 MG/1
50 TABLET, FILM COATED, EXTENDED RELEASE ORAL DAILY
Qty: 90 TABLET | Refills: 3 | Status: SHIPPED | OUTPATIENT
Start: 2025-08-25